# Patient Record
Sex: FEMALE | Race: WHITE | Employment: OTHER | URBAN - METROPOLITAN AREA
[De-identification: names, ages, dates, MRNs, and addresses within clinical notes are randomized per-mention and may not be internally consistent; named-entity substitution may affect disease eponyms.]

---

## 2017-04-12 ENCOUNTER — OFFICE VISIT (OUTPATIENT)
Dept: ORTHOPEDIC SURGERY | Age: 52
End: 2017-04-12

## 2017-04-12 VITALS
SYSTOLIC BLOOD PRESSURE: 130 MMHG | WEIGHT: 184 LBS | OXYGEN SATURATION: 97 % | HEART RATE: 73 BPM | TEMPERATURE: 97.7 F | RESPIRATION RATE: 18 BRPM | DIASTOLIC BLOOD PRESSURE: 80 MMHG | HEIGHT: 65 IN | BODY MASS INDEX: 30.66 KG/M2

## 2017-04-12 DIAGNOSIS — M47.27 OSTEOARTHRITIS OF SPINE WITH RADICULOPATHY, LUMBOSACRAL REGION: ICD-10-CM

## 2017-04-12 DIAGNOSIS — M62.830 MUSCLE SPASM OF BACK: ICD-10-CM

## 2017-04-12 DIAGNOSIS — M47.816 LUMBAR FACET ARTHROPATHY: ICD-10-CM

## 2017-04-12 DIAGNOSIS — M25.562 LEFT KNEE PAIN, UNSPECIFIED CHRONICITY: ICD-10-CM

## 2017-04-12 DIAGNOSIS — M54.50 PAIN OF LUMBAR SPINE: ICD-10-CM

## 2017-04-12 DIAGNOSIS — M96.1 LUMBAR POST-LAMINECTOMY SYNDROME: ICD-10-CM

## 2017-04-12 DIAGNOSIS — M53.3 PAIN OF BOTH SACROILIAC JOINTS: Primary | ICD-10-CM

## 2017-04-12 DIAGNOSIS — M53.3 SACROILIAC JOINT DYSFUNCTION OF BOTH SIDES: ICD-10-CM

## 2017-04-12 RX ORDER — CYCLOBENZAPRINE HCL 10 MG
10 TABLET ORAL
Qty: 30 TAB | Refills: 1 | Status: SHIPPED | OUTPATIENT
Start: 2017-04-12 | End: 2017-06-15 | Stop reason: SDUPTHER

## 2017-04-12 RX ORDER — MELOXICAM 15 MG/1
15 TABLET ORAL
COMMUNITY
End: 2017-09-25 | Stop reason: ALTCHOICE

## 2017-04-12 RX ORDER — TRAMADOL HYDROCHLORIDE 50 MG/1
50 TABLET ORAL
COMMUNITY
Start: 2013-11-27 | End: 2017-09-25 | Stop reason: ALTCHOICE

## 2017-04-12 RX ORDER — ONDANSETRON 8 MG/1
8 TABLET, ORALLY DISINTEGRATING ORAL
COMMUNITY
Start: 2014-05-13 | End: 2017-09-25 | Stop reason: ALTCHOICE

## 2017-04-12 RX ORDER — POLYETHYLENE GLYCOL 3350 17 G/17G
17 POWDER, FOR SOLUTION ORAL
COMMUNITY
End: 2017-09-25 | Stop reason: ALTCHOICE

## 2017-04-12 RX ORDER — ALPRAZOLAM 0.5 MG/1
TABLET ORAL
Refills: 1 | COMMUNITY
Start: 2017-04-05 | End: 2022-08-17

## 2017-04-12 RX ORDER — TAMSULOSIN HYDROCHLORIDE 0.4 MG/1
0.4 CAPSULE ORAL
COMMUNITY
Start: 2014-05-13 | End: 2017-09-25 | Stop reason: ALTCHOICE

## 2017-04-12 RX ORDER — LEVOTHYROXINE SODIUM 150 UG/1
TABLET ORAL
Refills: 4 | Status: ON HOLD | COMMUNITY
Start: 2017-02-26 | End: 2017-10-04

## 2017-04-12 RX ORDER — CYANOCOBALAMIN 1000 UG/ML
INJECTION, SOLUTION INTRAMUSCULAR; SUBCUTANEOUS
Refills: 2 | COMMUNITY
Start: 2017-03-07 | End: 2018-10-29

## 2017-04-12 RX ORDER — TOPIRAMATE 25 MG/1
75 TABLET ORAL
COMMUNITY
End: 2017-09-25 | Stop reason: ALTCHOICE

## 2017-04-12 RX ORDER — OXYCODONE AND ACETAMINOPHEN 5; 325 MG/1; MG/1
TABLET ORAL
COMMUNITY
Start: 2016-12-30 | End: 2017-09-25 | Stop reason: ALTCHOICE

## 2017-04-12 RX ORDER — FLUOXETINE HYDROCHLORIDE 40 MG/1
CAPSULE ORAL
Refills: 4 | COMMUNITY
Start: 2017-03-01 | End: 2017-09-25 | Stop reason: ALTCHOICE

## 2017-04-12 NOTE — PATIENT INSTRUCTIONS
Sacroiliac Pain: Exercises  Your Care Instructions  Here are some examples of typical rehabilitation exercises for your condition. Start each exercise slowly. Ease off the exercise if you start to have pain. Your doctor or physical therapist will tell you when you can start these exercises and which ones will work best for you. How to do the exercises  Knee-to-chest stretch    Do not do the knee-to-chest exercise if it causes or increases back or leg pain. 1. Lie on your back with your knees bent and your feet flat on the floor. You can put a small pillow under your head and neck if it is more comfortable. 2. Grasp your hands under one knee and bring the knee to your chest, keeping the other foot flat on the floor. 3. Keep your lower back pressed to the floor. Hold for at least 15 to 30 seconds. 4. Relax and lower the knee to the starting position. Repeat with the other leg. 5. Repeat 2 to 4 times with each leg. 6. To get more stretch, keep your other leg flat on the floor while pulling your knee to your chest.  Bridging    1. Lie on your back with both knees bent. Your knees should be bent about 90 degrees. 2. Tighten your belly muscles by pulling in your belly button toward your spine. Then push your feet into the floor, squeeze your buttocks, and lift your hips off the floor until your shoulders, hips, and knees are all in a straight line. 3. Hold for about 6 seconds as you continue to breathe normally, and then slowly lower your hips back down to the floor and rest for up to 10 seconds. 4. Repeat 8 to 12 times. Hip extension    1. Get down on your hands and knees on the floor. 2. Keeping your back and neck straight, lift one leg straight out behind you. When you lift your leg, keep your hips level. Don't let your back twist, and don't let your hip drop toward the floor. 3. Hold for 6 seconds. Repeat 8 to 12 times with each leg.   4. If you feel steady and strong when you do this exercise, you can make it more difficult. To do this, when you lift your leg, also lift the opposite arm straight out in front of you. For example, lift the left leg and the right arm at the same time. (This is sometimes called the \"bird dog exercise. \") Hold for 6 seconds, and repeat 8 to 12 times on each side. Clamshell    1. Lie on your side with a pillow under your head. Keep your feet and knees together and your knees bent. 2. Raise your top knee, but keep your feet together. Do not let your hips roll back. Your legs should open up like a clamshell. 3. Hold for 6 seconds. 4. Slowly lower your knee back down. Rest for 10 seconds. 5. Repeat 8 to 12 times. 6. Switch to your other side and repeat steps 1 through 5. Hamstring wall stretch    1. Lie on your back in a doorway, with one leg through the open door. 2. Slide your affected leg up the wall to straighten your knee. You should feel a gentle stretch down the back of your leg. ¨ Do not arch your back. ¨ Do not bend either knee. ¨ Keep one heel touching the floor and the other heel touching the wall. Do not point your toes. 3. Hold the stretch for at least 1 minute to begin. Then try to lengthen the time you hold the stretch to as long as 6 minutes. 4. Switch legs, and repeat steps 1 through 3.  5. Repeat 2 to 4 times. If you do not have a place to do this exercise in a doorway, there is another way to do it:  1. Lie on your back, and bend one knee. 2. Loop a towel under the ball and toes of that foot, and hold the ends of the towel in your hands. 3. Straighten your knee, and slowly pull back on the towel. You should feel a gentle stretch down the back of your leg. 4. Switch legs, and repeat steps 1 through 3.  5. Repeat 2 to 4 times. Lower abdominal strengthening    1. Lie on your back with your knees bent and your feet flat on the floor. 2. Tighten your belly muscles by pulling your belly button in toward your spine.   3. Lift one foot off the floor and bring your knee toward your chest, so that your knee is straight above your hip and your leg is bent like the letter \"L. \"  4. Lift the other knee up to the same position. 5. Lower one leg at a time to the starting position. 6. Keep alternating legs until you have lifted each leg 8 to 12 times. 7. Be sure to keep your belly muscles tight and your back still as you are moving your legs. Be sure to breathe normally. Piriformis stretch    1. Lie on your back with your legs straight. 2. Lift your affected leg, and bend your knee. With your opposite hand, reach across your body, and then gently pull your knee toward your opposite shoulder. 3. Hold the stretch for 15 to 30 seconds. 4. Switch legs and repeat steps 1 through 3.  5. Repeat 2 to 4 times. Follow-up care is a key part of your treatment and safety. Be sure to make and go to all appointments, and call your doctor if you are having problems. It's also a good idea to know your test results and keep a list of the medicines you take. Where can you learn more? Go to http://garry-neyda.info/. Enter N676 in the search box to learn more about \"Sacroiliac Pain: Exercises. \"  Current as of: May 23, 2016  Content Version: 11.2  © 9280-4473 [employer]. Care instructions adapted under license by [employer] (which disclaims liability or warranty for this information). If you have questions about a medical condition or this instruction, always ask your healthcare professional. [address] any warranty or liability for your use of this information.

## 2017-04-12 NOTE — PROGRESS NOTES
[facility]  [address] [place], [facility] [address]  Phone: [phone]  Fax: [phone]      ASSESSMENT   Cookie Quesada was seen today for back pain. Diagnoses and all orders for this visit:    Pain of both sacroiliac joints  -     SCHEDULE SURGERY    Sacroiliac joint dysfunction of both sides  -     SCHEDULE SURGERY    Left knee pain, unspecified chronicity  -     [36594] Knee 1-2V    Pain of lumbar spine  -     [56067] LS Spine 4V    Lumbar facet arthropathy (HCC)    Muscle spasm of back  -     cyclobenzaprine (FLEXERIL) 10 mg tablet; Take 1 Tab by mouth daily as needed for Muscle Spasm(s). Lumbar post-laminectomy syndrome    Osteoarthritis of spine with radiculopathy, lumbosacral region         IMPRESSION AND PLAN:  Rosa Nelson is a 47 y.o. female with history of chronic cervical and lumbar pain. She c/o progressively worsening pain in the left knee, left hip, and lower back. Pt admits to that she occasionally experience pain radiating down the legs and reports pain when rolling from side to side in bed. 1) Pt was given information on SI and lumbar arthritis exercises. 2) She was scheduled for bilateral SI injection. 3) Ms. Shanika Fried has a reminder for a \"due or due soon\" health maintenance. I have asked that she contact her primary care provider, Nino Telles MD, for follow-up on this health maintenance. 4)  reviewed. 5) Pt will follow-up in 1 month. 6) Flexeril prescription given  7) Consideration for new MRI pending response to injection  8) RFA also discussed with pt     HISTORY OF PRESENT ILLNESS:  Rosa Nelson is a 47 y.o. female with history of chronic cervical and lumbar pain. She c/o progressively worsening pain in the left knee, left hip, and lower back. Pt reports that at times she is unable to dress herself or lift legs due to the severe pain.  She admits to that she occasionally experience pain radiating down the legs, L>R. Pt also admits to pain when rolling from side to side in bed. She has not yet been followed by an orthopedist. Pt has tried bilateral L4-5 and L5-S1 facet injections with temporary relief. Pt has had 3 prior lumbar surgeries, the first surgery with Dr. Brynn Gallegos and the following 2 with Dr. Carloz Justice. Pt at this time desires to proceed with a bilateral SI injection. Pain Scale: 8/10    PCP: Samantha Curran MD       Past Medical History:   Diagnosis Date    Arthritis     Depressed 10/1/2015    Depression     Hypothyroidism     Myofascial muscle pain 10/1/2015    Narcolepsy     Psychiatric disorder     Sleep apnea     Unspecified sleep apnea     uses cpap machine        Social History     Social History    Marital status: LEGALLY      Spouse name: N/A    Number of children: N/A    Years of education: N/A     Occupational History    Not on file. Social History Main Topics    Smoking status: Current Every Day Smoker     Packs/day: 0.50    Smokeless tobacco: Never Used    Alcohol use Yes      Comment: weekends and few times per week    Drug use: No    Sexual activity: Yes     Birth control/ protection: Surgical      Comment: Hysterectomy     Other Topics Concern    Not on file     Social History Narrative       Current Outpatient Prescriptions   Medication Sig Dispense Refill    ALPRAZolam (XANAX) 0.5 mg tablet TK 1 T PO ONCE A DAY PRN FOR ANXIETY. 1    cyanocobalamin (VITAMIN B12) 1,000 mcg/mL injection INJECT 1ML SUBCUTANEOUSLY EVERY 2 WEEKS  2    FLUoxetine (PROZAC) 40 mg capsule TK ONE C PO  QAM  4    levothyroxine (SYNTHROID) 150 mcg tablet TK 1 T PO ONCE A DAY  4    cyclobenzaprine (FLEXERIL) 10 mg tablet Take 1 Tab by mouth daily as needed for Muscle Spasm(s). 30 Tab 1    liothyronine (CYTOMEL) 25 mcg tablet   3    meloxicam (MOBIC) 15 mg tablet 15 mg.      ondansetron (ZOFRAN ODT) 8 mg disintegrating tablet 8 mg.       oxyCODONE-acetaminophen (PERCOCET) 5-325 mg per tablet Take 1 Tab by Mouth Every 4 Hours As Needed for Pain. Do not exceed 4000 mg of acetaminophen per day.  polyethylene glycol (MIRALAX) 17 gram packet 17 g.  tamsulosin (FLOMAX) 0.4 mg capsule 0.4 mg.      topiramate (TOPAMAX) 25 mg tablet 75 mg.  traMADol (ULTRAM) 50 mg tablet 50 mg.      methylPREDNISolone (MEDROL, INA,) 4 mg tablet TAKE AS DIRECTED 1 Dose Pack 0    DULoxetine (CYMBALTA) 60 mg capsule Take 1 Cap by mouth two (2) times a day. 60 Cap 0    lidocaine-prilocaine (EMLA) topical cream as needed.  HYDROcodone-acetaminophen (NORCO) 5-325 mg per tablet Take 1 Tab by mouth two (2) times daily as needed for Pain. Max Daily Amount: 2 Tabs. 60 Tab 0    cream base no. 171, bulk, (COMPOUNDMAX BASE) crea 240 g by Does Not Apply route four (4) times daily. 240 g 1    levothyroxine (SYNTHROID) 175 mcg tablet Take 175 mcg by mouth Daily (before breakfast). Allergies   Allergen Reactions    Morphine Itching    Naproxen Itching         REVIEW OF SYSTEMS    Constitutional: Negative for fever, chills, or weight change. Respiratory: Negative for cough or shortness of breath. Cardiovascular: Negative for chest pain or palpitations. Gastrointestinal: Negative for acid reflux, change in bowel habits, or constipation. Genitourinary: Negative for dysuria and flank pain. Musculoskeletal: Positive for lumbar pain. Skin: Negative for rash. Neurological: Negative for headaches, dizziness, or numbness. Endo/Heme/Allergies: Negative for increased bruising. Psychiatric/Behavioral: Negative for difficulty with sleep. PHYSICAL EXAMINATION  Visit Vitals    /80    Pulse 73    Temp 97.7 °F (36.5 °C) (Oral)    Resp 18    Ht 5' 5\" (1.651 m)    Wt 184 lb (83.5 kg)    SpO2 97%    BMI 30.62 kg/m2       Constitutional: Awake, alert, and in no acute distress  Neurological: 1+ symmetrical DTRs in the upper extremities. 1+ symmetrical DTRs in the lower extremities.  Sensation to light touch is intact. Negative Susi's sign bilaterally. Skin: warm, dry, and intact. Musculoskeletal: Pain with palpation over the left SI joint. Moderate pain with extension, side to side flexion, and axial loading. Moderate tenderness to palpation over the medial aspect of the left knee. Positive SID's test bilaterally. Negative straight leg raise bilaterally. Hip Flex  Quads Hamstrings Ankle DF EHL Ankle PF   Right +4/5 +4/5 +4/5 +4/5 +4/5 +4/5   Left 4/5 4/5 4/5 +4/5 +4/5 +4/5     IMAGING:    Lumbar spine 4V x-rays from 04/12/2017 were personally reviewed with the Pt and demonstrated:  1) Dextroscoliosis . 2) Significant bony hypertrophy at L5-S1 bilaterally. 3) Anterior fusion L5-S1. 4) Mild calcification in the aorta. 5) Disc replacement at L5-S1. 6) Significant facet arthropathy at L4-5.   7) Mild degenerative discs at L1 and L3.  8) No instability. Knee x-rays from 04/12/2017 were personally reviewed with the Pt and demonstrated:  1) Normal joint space bilaterally. 2) No significant degenerative changes in the left knee. Cervical spine MRI from 06/04/2013 was personally reviewed with the Pt and demonstrated:  Results from Hospital Encounter encounter on 06/04/13   MRI CERV SPINE WO CONT    Narrative Sagittal and axial multisequence MR images of cervical spine were obtained. Normal alignment. No compression fracture or pathologic marrow signal. No  prevertebral soft tissue abnormalities. Craniocervical junction is normal.  Spinal cord shows normal signal intensity and morphology. C2-C3: No disc herniation, central or foraminal stenosis. C3-C4: No disc herniation, central or foraminal stenosis. C4-C5: Diffuse posterior disc bulge with small left paracentral disc  protrusion, slightly contacting spinal cord with no cord compression or edema. No central stenosis. No foraminal stenosis.     C5-C6: Mild posterior disc bulge with no central or foraminal stenosis. C6-C7: Mild posterior disc bulge with no central stenosis. Mild left foraminal  stenosis. Patent right foramen. C7-T1: Unremarkable.           Impression Impression: Minimal degenerative disc disease, most prominent at C4-C5 as  described.          Lumbar spine MRI from 11/07/2012 was personally reviewed with the Pt and demonstrated:  Results from [place] encounter on 11/07/12   MRI LUMB SPINE W WO CONT    Narrative Sagittal and axial multisequence MR images of lumbar spine were obtained  without and with 20 cc Magnevist gadolinium IV contrast.    No prior studies. Significant motion artifacts throughout. Disc spacer at L4-L5 and L5-S1 with  anterior anchoring screw L5-S1. There is mild anterior spondylolisthesis of L5. Posterior soft tissue artifacts in lower back. No compression fracture or  pathologic marrow signal. No abnormal enhancement. Likely small cyst in the  left kidney. Conus medullaris ends at L1 with normal morphology and signal  intensity. No abnormal enhancement. No epidural collection. L1-L2: Unremarkable. L2-L3: Minimal posterior disc bulge. No central or foraminal stenosis. Tiny  bilateral facet joint effusion. L3-L4: No disc herniation. Facet hypertrophy with mild ligamentous hypertrophy. No central stenosis. No foraminal stenosis. L4-L5: No disc material. Prominent anterior epidural fat but no central  stenosis, thecal sac compression or distortion. No suggestion of arachnoiditis. Patent neural foramina. Bony bridging along the facets, likely from previous  surgery. L5-S1: Prominent epidural fat. No disc material. No thecal sac compression. No  significant foraminal stenosis or exiting L5 nerve root compression. Small  perineuronal cysts in the sacral segment. Inflammation seen in L5-S1 facet  joints           Impression Impression: Postoperative change as above. No central stenosis or foraminal  stenosis. No disc herniation.  Facet inflammation at L5- S1.          Written by Genevieve Rodríguez, as dictated by Oc Sanchez MD.  I, Dr. Oc Sanchez confirm that all documentation is accurate.

## 2017-04-26 ENCOUNTER — HOSPITAL ENCOUNTER (OUTPATIENT)
Age: 52
Setting detail: OUTPATIENT SURGERY
Discharge: HOME | End: 2017-04-26
Attending: PHYSICAL MEDICINE & REHABILITATION | Admitting: PHYSICAL MEDICINE & REHABILITATION
Payer: MEDICARE

## 2017-04-26 ENCOUNTER — APPOINTMENT (OUTPATIENT)
Dept: GENERAL RADIOLOGY | Age: 52
End: 2017-04-26
Attending: PHYSICAL MEDICINE & REHABILITATION
Payer: MEDICARE

## 2017-04-26 VITALS
OXYGEN SATURATION: 96 % | TEMPERATURE: 98.1 F | RESPIRATION RATE: 16 BRPM | SYSTOLIC BLOOD PRESSURE: 98 MMHG | HEIGHT: 65 IN | WEIGHT: 184 LBS | DIASTOLIC BLOOD PRESSURE: 80 MMHG | BODY MASS INDEX: 30.66 KG/M2 | HEART RATE: 96 BPM

## 2017-04-26 PROCEDURE — 74011250636 HC RX REV CODE- 250/636

## 2017-04-26 PROCEDURE — 74011250637 HC RX REV CODE- 250/637: Performed by: PHYSICAL MEDICINE & REHABILITATION

## 2017-04-26 PROCEDURE — 76010000009 HC PAIN MGT 0 TO 30 MIN PROC: Performed by: PHYSICAL MEDICINE & REHABILITATION

## 2017-04-26 PROCEDURE — 74011636320 HC RX REV CODE- 636/320

## 2017-04-26 PROCEDURE — 74011000250 HC RX REV CODE- 250

## 2017-04-26 RX ORDER — DEXAMETHASONE SODIUM PHOSPHATE 100 MG/10ML
INJECTION INTRAMUSCULAR; INTRAVENOUS AS NEEDED
Status: DISCONTINUED | OUTPATIENT
Start: 2017-04-26 | End: 2017-04-26 | Stop reason: HOSPADM

## 2017-04-26 RX ORDER — LIDOCAINE HYDROCHLORIDE 10 MG/ML
INJECTION, SOLUTION EPIDURAL; INFILTRATION; INTRACAUDAL; PERINEURAL AS NEEDED
Status: DISCONTINUED | OUTPATIENT
Start: 2017-04-26 | End: 2017-04-26 | Stop reason: HOSPADM

## 2017-04-26 RX ORDER — DIAZEPAM 5 MG/1
5-20 TABLET ORAL ONCE
Status: COMPLETED | OUTPATIENT
Start: 2017-04-26 | End: 2017-04-26

## 2017-04-26 RX ORDER — SODIUM CHLORIDE 0.9 % (FLUSH) 0.9 %
5-10 SYRINGE (ML) INJECTION AS NEEDED
Status: DISCONTINUED | OUTPATIENT
Start: 2017-04-26 | End: 2017-04-26 | Stop reason: HOSPADM

## 2017-04-26 RX ADMIN — DIAZEPAM 10 MG: 5 TABLET ORAL at 13:48

## 2017-04-26 NOTE — PROCEDURES
Bi Procedure Note    Patient Name: Shaniqua Richards    Date of Procedure: April 26, 2017    Preoperative Diagnosis: Sacroiliac Joint Pain and Dysfunction    Post Operative Diagnosis: same    Procedure: SI Joint Injection bilateral      Consent: Informed consent was obtained prior to the procedure. The patient was given the opportunity to ask questions regarding the procedure and its associated risks. In addition to the potential risks associated with the procedure itself, the patient was informed both verbally and in writing of potential side effects of the use of glucocorticoids. The patient appeared to comprehend the informed consent and desired to have the procedure performed. Procedure: The patient was placed in the prone position on the flouroscopy table and the back was prepped and draped in the usual sterile manner. A #22 gauge spinal needle was then advanced to lie within the SI joint after local Lidocaine 1% injection. The procedure was repeated for the contralateral SI joint. A total of 30 mg of preservative free dexamethasone  and 5 ml of Lidocaine was introduced into SI joints. The injection area was cleaned and bandaids applied. No excessive bleeding was noted. Patient dressed and was discharged to home with instructions. Discussion:  (x) The patient tolerated the procedure well.     ( )       Corrie Shepherd MD  April 26, 2017

## 2017-04-26 NOTE — H&P
Date of Surgery Update:  Mari Burgos was seen and examined. History and physical has been reviewed. The patient has been examined. There have been no significant clinical changes since the completion of the last office visit.       Signed By: Néstor Lynn MD     April 26, 2017 1:17 PM

## 2017-04-26 NOTE — DISCHARGE INSTRUCTIONS
[facility]   (ANDRES)  Dr. Dorothy Hamman, Dr. Bonnie Fortune, Dr. Nydia Lott not drive a car, operate heavy machinery or dangerous equipment for 24 hours. * Activity as tolerated; rest for the remainder of the day. * Resume pre-block medications including those for your family doctor. * Do not drink alcoholic beverages for 24 hours. Alcohol and the medications you have received may interact and cause an adverse reaction. * You may feel better this evening and worse tomorrow, as the numbing medications wears off and the steroid has yet to begin to work. After 48 hrs the steroid should begin to release bringing you relief. * You may shower this evening and remove any bandages. * Avoid hot tubs and heating pads for 24 hours. You may use cold packs on the procedure site as tolerated for the first 24 hours. * If a headache develops, drink plenty of fluids and rest.  Take over the counter medications for headache if needed. If the headache continues longer than 24 hours, call MD at the [address] [place] [address]. [phone]    * Continue taking pain medications as needed. * You may resume your regular diet if tolerated. Otherwise, start with sips of water and advance slowly. * If Diabetic: check your blood sugar three times a day for the next 3 days. If your sugar is greater than 300 call your family doctor. If your sugar is greater than 400, have someone transport you to the nearest Emergency Room. * If you experience any of the following problems, Please Call the [address] [place] [address] at [phone].         * Shortness of Breath    * Fever of 101 or higher    * Nausea / Vomiting    * Severe Headache    * Weakness or numbness in arms or legs that is not      resolving    * Prolonged increase in pain greater than 4 days      DISCHARGE SUMMARY from Nurse      PATIENT INSTRUCTIONS:    After oral sedation, for 24 hours or while taking prescription Narcotics:  · Limit your activities  · Do not drive and operate hazardous machinery  · Do not make important personal or business decisions  · Do  not drink alcoholic beverages  · If you have not urinated within 8 hours after discharge, please contact your surgeon on call. Report the following to your surgeon:  · Excessive pain, swelling, redness or odor of or around the surgical area  · Temperature over 101  · Nausea and vomiting lasting longer than 4 hours or if unable to take medications  · Any signs of decreased circulation or nerve impairment to extremity: change in color, persistent  numbness, tingling, coldness or increase pain  · Any questions            What to do at Home:  Recommended activity: Activity as tolerated, NO DRIVING FOR 12 Hours post injection          *  Please give a list of your current medications to your Primary Care Provider. *  Please update this list whenever your medications are discontinued, doses are      changed, or new medications (including over-the-counter products) are added. *  Please carry medication information at all times in case of emergency situations. These are general instructions for a healthy lifestyle:    No smoking/ No tobacco products/ Avoid exposure to second hand smoke    Surgeon General's Warning:  Quitting smoking now greatly reduces serious risk to your health. Obesity, smoking, and sedentary lifestyle greatly increases your risk for illness    A healthy diet, regular physical exercise & weight monitoring are important for maintaining a healthy lifestyle    You may be retaining fluid if you have a history of heart failure or if you experience any of the following symptoms:  Weight gain of 3 pounds or more overnight or 5 pounds in a week, increased swelling in our hands or feet or shortness of breath while lying flat in bed.   Please call your doctor as soon as you notice any of these symptoms; do not wait until your next office visit. Recognize signs and symptoms of STROKE:    F-face looks uneven    A-arms unable to move or move unevenly    S-speech slurred or non-existent    T-time-call 911 as soon as signs and symptoms begin-DO NOT go       Back to bed or wait to see if you get better-TIME IS BRAIN. MyChart Activation    Thank you for requesting access to [employer]. Please follow the instructions below to securely access and download your online medical record. [employer] allows you to send messages to your doctor, view your test results, renew your prescriptions, schedule appointments, and more. How Do I Sign Up? 1. In your internet browser, go to [email or web address]  2. Click on the First Time User? Click Here link in the Sign In box. You will be redirect to the New Member Sign Up page. 3. Enter your [employer] Access Code exactly as it appears below. You will not need to use this code after youve completed the sign-up process. If you do not sign up before the expiration date, you must request a new code. [employer] Access Code: [email or web address]  Expires: 2017  2:39 PM (This is the date your [employer] access code will )    4. Enter the last four digits of your Social Security Number (xxxx) and Date of Birth (mm/dd/yyyy) as indicated and click Submit. You will be taken to the next sign-up page. 5. Create a [employer] ID. This will be your [employer] login ID and cannot be changed, so think of one that is secure and easy to remember. 6. Create a [employer] password. You can change your password at any time. 7. Enter your Password Reset Question and Answer. This can be used at a later time if you forget your password. 8. Enter your e-mail address. You will receive e-mail notification when new information is available in [address] 9. Click Sign Up. You can now view and download portions of your medical record.   10. Click the Download Summary menu link to download a portable copy of your medical information. Additional Information    If you have questions, please visit the Frequently Asked Questions section of the [employer] website at [email or web address] Remember, [employer] is NOT to be used for urgent needs. For medical emergencies, dial 911.

## 2017-06-15 DIAGNOSIS — M62.830 MUSCLE SPASM OF BACK: ICD-10-CM

## 2017-06-15 RX ORDER — CYCLOBENZAPRINE HCL 10 MG
TABLET ORAL
Qty: 30 TAB | Refills: 0 | Status: SHIPPED | OUTPATIENT
Start: 2017-06-15 | End: 2017-09-25 | Stop reason: ALTCHOICE

## 2017-09-25 ENCOUNTER — OFFICE VISIT (OUTPATIENT)
Dept: ORTHOPEDIC SURGERY | Age: 52
End: 2017-09-25

## 2017-09-25 VITALS
HEIGHT: 65 IN | WEIGHT: 188 LBS | SYSTOLIC BLOOD PRESSURE: 133 MMHG | DIASTOLIC BLOOD PRESSURE: 68 MMHG | RESPIRATION RATE: 18 BRPM | TEMPERATURE: 98 F | BODY MASS INDEX: 31.32 KG/M2 | HEART RATE: 82 BPM | OXYGEN SATURATION: 92 %

## 2017-09-25 DIAGNOSIS — M96.1 LUMBAR POST-LAMINECTOMY SYNDROME: Primary | ICD-10-CM

## 2017-09-25 DIAGNOSIS — M47.816 SPONDYLOSIS OF LUMBAR REGION WITHOUT MYELOPATHY OR RADICULOPATHY: ICD-10-CM

## 2017-09-25 PROBLEM — M54.50 CHRONIC BILATERAL LOW BACK PAIN WITHOUT SCIATICA: Status: ACTIVE | Noted: 2017-09-25

## 2017-09-25 PROBLEM — G89.29 CHRONIC BILATERAL LOW BACK PAIN WITHOUT SCIATICA: Status: ACTIVE | Noted: 2017-09-25

## 2017-09-25 RX ORDER — METHYLPREDNISOLONE 4 MG/1
TABLET ORAL
Qty: 1 DOSE PACK | Refills: 0 | Status: ON HOLD | OUTPATIENT
Start: 2017-09-25 | End: 2017-11-29

## 2017-09-25 NOTE — PROGRESS NOTES
Chief complaint/History of Present Illness:  Chief Complaint   Patient presents with    Back Pain     follow up, lower back pain, sometimes burning pain    Hip Pain     HPI  Loy Gardiner is a  47 y.o.  female      HISTORY OF PRESENT ILLNESS:  The patient comes in today after having last been seen on October 12, 2016, by Dr. Avani Castro. This is a patient who has had three prior back surgeries, an ALIF at L5-S1, PLIF at L4-L5. So, she is fused from L4 to S1. Apparently, he had a surgery by Dr. Stevan Clements, a surgery by Dr. Guevara Perez, and removal of hardware by Dr. Alfonso Abebe in December 2009 but continues to suffer from low back pain. She is not having any radiating leg pain. She states her pain is in the low back and then increases up into her spine. She feels like sometimes when her shirt hits her skin, that causes pain. She feels like she has some swelling. About 7-10 days ago, when she was at [facility], she had a flare of pain without injury or excessive activity. She tried using heat, ice, Tylenol, and Bio Freeze without much help. She states Ibuprofen does not really help, but she is able to take it. She is allergic to Naproxen. She states it gives her shortness of breath and hives. She denies fever. She does have urinary incontinence, it sounds like, from time to time and does have a urology appointment coming up. She has had a bladder tack in the past.  She states Flexeril really does not help all that much. She smokes one-fourth pack of cigarettes per day. She is on Social Security disability. PHYSICAL EXAM:  Ms. Luba Faith is a 41-year-old female. She is alert and oriented. She has a full weight bearing, slightly antalgic gait and 4/5 strength of the bilateral lower extremities and negative straight leg raise. She has a little bit of pain, very mild pain, with internal and external rotation of that left hip.        ASSESSENT/PLAN:  This is a patient with post lumbar laminectomy syndrome, lumbar spondylosis. She has had facet blocks in the past that seemed to help. We will go ahead and reschedule her for bilateral L4-L5 and L5-S1 facet blocks. I am also calling her in a Medrol Dosepak to start. She takes Cymbalta 60 mg through Dr. Humberto Christina for bipolar disease. We will see her back after the block. Review of systems:    Past Medical History:   Diagnosis Date    Arthritis     Depressed 10/1/2015    Depression     Hypothyroidism     Myofascial muscle pain 10/1/2015    Narcolepsy     Psychiatric disorder     Sleep apnea     Unspecified sleep apnea     uses cpap machine     Past Surgical History:   Procedure Laterality Date    HX BLADDER SUSPENSION      HX CHOLECYSTECTOMY      HX HYSTERECTOMY      HX LUMBAR FUSION  2000    L4-L5-S1 decompression and fusion, Dr. Kd Escobar  2010    removal of segmental instrumentation, Dr. Alma Grove  2014    and removed bursitis from right shoulder    HX ORTHOPAEDIC      Right Foot surgery    HX SHOULDER ARTHROSCOPY Right      Social History     Social History    Marital status: LEGALLY      Spouse name: N/A    Number of children: N/A    Years of education: N/A     Occupational History    Not on file.      Social History Main Topics    Smoking status: Current Every Day Smoker     Packs/day: 0.50    Smokeless tobacco: Never Used    Alcohol use Yes      Comment: weekends and few times per week    Drug use: No    Sexual activity: Yes     Birth control/ protection: Surgical      Comment: Hysterectomy     Other Topics Concern    Not on file     Social History Narrative     Family History   Problem Relation Age of Onset    Cancer Mother     Heart Disease Father     Kidney Disease Father     Diabetes Father     No Known Problems Brother        Physical Exam:  Visit Vitals    /68    Pulse 82    Temp 98 °F (36.7 °C) (Oral)    Resp 18    Ht 5' 5\" (1.651 m)    Wt 188 lb (85.3 kg)    SpO2 92%    BMI 31.28 kg/m2     Pain Scale: 5/10     has been . reviewed and is appropriate          Diagnoses and all orders for this visit:    1. Lumbar post-laminectomy syndrome    2. Spondylosis of lumbar region without myelopathy or radiculopathy  -     SCHEDULE SURGERY    Other orders  -     methylPREDNISolone (MEDROL DOSEPACK) 4 mg tablet; Per dose pack instructions            Follow-up Disposition:  Return for facet block.         We have informed Loy Gardiner to notify us for immediate appointment if she has any worsening neurogical symptoms or if an emergency situation presents, then call [phone]

## 2017-09-25 NOTE — PATIENT INSTRUCTIONS
WhoSay Activation    Thank you for requesting access to WhoSay. Please follow the instructions below to securely access and download your online medical record. WhoSay allows you to send messages to your doctor, view your test results, renew your prescriptions, schedule appointments, and more. How Do I Sign Up? 1. In your internet browser, go to [email or web address]  2. Click on the First Time User? Click Here link in the Sign In box. You will be redirect to the New Member Sign Up page. 3. Enter your WhoSay Access Code exactly as it appears below. You will not need to use this code after youve completed the sign-up process. If you do not sign up before the expiration date, you must request a new code. WhoSay Access Code: Jeana Lopez  Expires: 2017  8:54 AM (This is the date your WhoSay access code will )    4. Enter the last four digits of your Social Security Number (xxxx) and Date of Birth (mm/dd/yyyy) as indicated and click Submit. You will be taken to the next sign-up page. 5. Create a WhoSay ID. This will be your WhoSay login ID and cannot be changed, so think of one that is secure and easy to remember. 6. Create a WhoSay password. You can change your password at any time. 7. Enter your Password Reset Question and Answer. This can be used at a later time if you forget your password. 8. Enter your e-mail address. You will receive e-mail notification when new information is available in [address] 9. Click Sign Up. You can now view and download portions of your medical record. 10. Click the Download Summary menu link to download a portable copy of your medical information. Additional Information    If you have questions, please visit the Frequently Asked Questions section of the WhoSay website at [email or web address] Remember, WhoSay is NOT to be used for urgent needs. For medical emergencies, dial 911.          Back Pain: Care Instructions  Your Care Instructions    Back pain has many possible causes. It is often related to problems with muscles and ligaments of the back. It may also be related to problems with the nerves, discs, or bones of the back. Moving, lifting, standing, sitting, or sleeping in an awkward way can strain the back. Sometimes you don't notice the injury until later. Arthritis is another common cause of back pain. Although it may hurt a lot, back pain usually improves on its own within several weeks. Most people recover in 12 weeks or less. Using good home treatment and being careful not to stress your back can help you feel better sooner. Follow-up care is a key part of your treatment and safety. Be sure to make and go to all appointments, and call your doctor if you are having problems. Its also a good idea to know your test results and keep a list of the medicines you take. How can you care for yourself at home? · Sit or lie in positions that are most comfortable and reduce your pain. Try one of these positions when you lie down:  ¨ Lie on your back with your knees bent and supported by large pillows. ¨ Lie on the floor with your legs on the seat of a sofa or chair. Kay Sermons on your side with your knees and hips bent and a pillow between your legs. ¨ Lie on your stomach if it does not make pain worse. · Do not sit up in bed, and avoid soft couches and twisted positions. Bed rest can help relieve pain at first, but it delays healing. Avoid bed rest after the first day of back pain. · Change positions every 30 minutes. If you must sit for long periods of time, take breaks from sitting. Get up and walk around, or lie in a comfortable position. · Try using a heating pad on a low or medium setting for 15 to 20 minutes every 2 or 3 hours. Try a warm shower in place of one session with the heating pad. · You can also try an ice pack for 10 to 15 minutes every 2 to 3 hours.  Put a thin cloth between the ice pack and your skin. · Take pain medicines exactly as directed. ¨ If the doctor gave you a prescription medicine for pain, take it as prescribed. ¨ If you are not taking a prescription pain medicine, ask your doctor if you can take an over-the-counter medicine. · Take short walks several times a day. You can start with 5 to 10 minutes, 3 or 4 times a day, and work up to longer walks. Walk on level surfaces and avoid hills and stairs until your back is better. · Return to work and other activities as soon as you can. Continued rest without activity is usually not good for your back. · To prevent future back pain, do exercises to stretch and strengthen your back and stomach. Learn how to use good posture, safe lifting techniques, and proper body mechanics. When should you call for help? Call your doctor now or seek immediate medical care if:  · You have new or worsening numbness in your legs. · You have new or worsening weakness in your legs. (This could make it hard to stand up.)  · You lose control of your bladder or bowels. Watch closely for changes in your health, and be sure to contact your doctor if:  · Your pain gets worse. · You are not getting better after 2 weeks. Where can you learn more? Go to http://garry-neyda.info/. Enter V516 in the search box to learn more about \"Back Pain: Care Instructions. \"  Current as of: March 21, 2017  Content Version: 11.3  © 7539-5320 [employer]ov[employer]. Care instructions adapted under license by Flexenclosure (which disclaims liability or warranty for this information). If you have questions about a medical condition or this instruction, always ask your healthcare professional. [address] any warranty or liability for your use of this information.

## 2017-09-25 NOTE — MR AVS SNAPSHOT
Visit Information Date & Time Provider Department Dept. Phone Encounter #  
 9/25/2017  9:10 AM Jeovany Baca NP [facility] [phone] [id number] Follow-up Instructions Return for facet block. Follow-up and Disposition History 11/3/2017  3:00 PM  
Any with Elli De Leon MD  
[facility] [address] ([address]) Appt Note: NP  
 [address] [facility] [address] Upcoming Health Maintenance Date Due Hepatitis C Screening 1965 Pneumococcal 19-64 Medium Risk (1 of 1 - PPSV23) 4/4/1983 DTaP/Tdap/Td series (1 - Tdap) 4/4/1985 PAP AKA CERVICAL CYTOLOGY 4/4/1985 BREAST CANCER SCRN MAMMOGRAM 4/4/2014 FOBT Q 1 YEAR AGE 50-75 4/4/2014 INFLUENZA AGE 9 TO ADULT 8/1/2017 Allergies as of 9/25/2017  Review Complete On: 9/25/2017 By: Jeovany Baca NP Severity Noted Reaction Type Reactions Morphine  01/29/2013    Itching Naproxen  01/29/2013    Itching Swelling, hives, difficulty breathing Current Immunizations  Never Reviewed No immunizations on file. Not reviewed this visit You Were Diagnosed With   
  
 Codes Comments Lumbar post-laminectomy syndrome    -  Primary ICD-10-CM: M96.1 ICD-9-CM: 722.83 Spondylosis of lumbar region without myelopathy or radiculopathy     ICD-10-CM: M47.816 ICD-9-CM: 721.3 Vitals BP Pulse Temp Resp Height(growth percentile) Weight(growth percentile) 133/68 82 98 °F (36.7 °C) (Oral) 18 5' 5\" (1.651 m) 188 lb (85.3 kg) SpO2 BMI OB Status Smoking Status 92% 31.28 kg/m2 Hysterectomy Current Every Day Smoker BMI and BSA Data Body Mass Index Body Surface Area  
 31.28 kg/m 2 1.98 m 2 Preferred Pharmacy Pharmacy Name Phone  [phone] [place] [facility] [address] [place] [facility] [phone] Your Updated Medication List  
  
   
This list is accurate as of: 9/25/17 10:00 AM.  Always use your most recent med list.  
  
  
  
  
 ALPRAZolam 0.5 mg tablet Commonly known as:  Zachariah Flack TK 1 T PO ONCE A DAY PRN FOR ANXIETY. cyanocobalamin 1,000 mcg/mL injection Commonly known as:  VITAMIN B12 INJECT 1ML SUBCUTANEOUSLY EVERY 2 WEEKS DULoxetine 60 mg capsule Commonly known as:  CYMBALTA Take 1 Cap by mouth two (2) times a day. * levothyroxine 175 mcg tablet Commonly known as:  SYNTHROID Take 175 mcg by mouth Daily (before breakfast). * levothyroxine 150 mcg tablet Commonly known as:  SYNTHROID TK 1 T PO ONCE A DAY  
  
 methylPREDNISolone 4 mg tablet Commonly known as:  Daya Sample Per dose pack instructions * Notice: This list has 2 medication(s) that are the same as other medications prescribed for you. Read the directions carefully, and ask your doctor or other care provider to review them with you. Prescriptions Sent to Pharmacy Refills  
 methylPREDNISolone (MEDROL DOSEPACK) 4 mg tablet 0 Sig: Per dose pack instructions Class: Normal  
 Pharmacy: [address] [place] [address], [address] [place] [address] #: [phone] We Performed the Following SCHEDULE SURGERY [[id number] Custom] Follow-up Instructions Return for facet block. Patient Instructions SaaSAssurance Activation Thank you for requesting access to SaaSAssurance. Please follow the instructions below to securely access and download your online medical record. SaaSAssurance allows you to send messages to your doctor, view your test results, renew your prescriptions, schedule appointments, and more. How Do I Sign Up? 1. In your internet browser, go to [email or web address] 
2. Click on the First Time User? Click Here link in the Sign In box.  You will be redirect to the New Member Sign Up page. 3. Enter your [facility] Access Code exactly as it appears below. You will not need to use this code after youve completed the sign-up process. If you do not sign up before the expiration date, you must request a new code. [facility] Access Code: Rosario Ramon Expires: 2017  8:54 AM (This is the date your [facility] access code will ) 4. Enter the last four digits of your Social Security Number (xxxx) and Date of Birth (mm/dd/yyyy) as indicated and click Submit. You will be taken to the next sign-up page. 5. Create a [facility] ID. This will be your [facility] login ID and cannot be changed, so think of one that is secure and easy to remember. 6. Create a [facility] password. You can change your password at any time. 7. Enter your Password Reset Question and Answer. This can be used at a later time if you forget your password. 8. Enter your e-mail address. You will receive e-mail notification when new information is available in [address] 9. Click Sign Up. You can now view and download portions of your medical record. 10. Click the Download Summary menu link to download a portable copy of your medical information. Additional Information If you have questions, please visit the Frequently Asked Questions section of the [facility] website at [email or web address] Remember, [facility] is NOT to be used for urgent needs. For medical emergencies, dial 911. Back Pain: Care Instructions Your Care Instructions Back pain has many possible causes. It is often related to problems with muscles and ligaments of the back. It may also be related to problems with the nerves, discs, or bones of the back. Moving, lifting, standing, sitting, or sleeping in an awkward way can strain the back. Sometimes you don't notice the injury until later. Arthritis is another common cause of back pain. Although it may hurt a lot, back pain usually improves on its own within several weeks. Most people recover in 12 weeks or less. Using good home treatment and being careful not to stress your back can help you feel better sooner. Follow-up care is a key part of your treatment and safety. Be sure to make and go to all appointments, and call your doctor if you are having problems. Its also a good idea to know your test results and keep a list of the medicines you take. How can you care for yourself at home? · Sit or lie in positions that are most comfortable and reduce your pain. Try one of these positions when you lie down: ¨ Lie on your back with your knees bent and supported by large pillows. ¨ Lie on the floor with your legs on the seat of a sofa or chair. Radha Mould on your side with your knees and hips bent and a pillow between your legs. ¨ Lie on your stomach if it does not make pain worse. · Do not sit up in bed, and avoid soft couches and twisted positions. Bed rest can help relieve pain at first, but it delays healing. Avoid bed rest after the first day of back pain. · Change positions every 30 minutes. If you must sit for long periods of time, take breaks from sitting. Get up and walk around, or lie in a comfortable position. · Try using a heating pad on a low or medium setting for 15 to 20 minutes every 2 or 3 hours. Try a warm shower in place of one session with the heating pad. · You can also try an ice pack for 10 to 15 minutes every 2 to 3 hours. Put a thin cloth between the ice pack and your skin. · Take pain medicines exactly as directed. ¨ If the doctor gave you a prescription medicine for pain, take it as prescribed. ¨ If you are not taking a prescription pain medicine, ask your doctor if you can take an over-the-counter medicine. · Take short walks several times a day. You can start with 5 to 10 minutes, 3 or 4 times a day, and work up to longer walks.  Walk on level surfaces and avoid hills and stairs until your back is better. · Return to work and other activities as soon as you can. Continued rest without activity is usually not good for your back. · To prevent future back pain, do exercises to stretch and strengthen your back and stomach. Learn how to use good posture, safe lifting techniques, and proper body mechanics. When should you call for help? Call your doctor now or seek immediate medical care if: 
· You have new or worsening numbness in your legs. · You have new or worsening weakness in your legs. (This could make it hard to stand up.) · You lose control of your bladder or bowels. Watch closely for changes in your health, and be sure to contact your doctor if: 
· Your pain gets worse. · You are not getting better after 2 weeks. Where can you learn more? Go to http://garry-neyda.info/. Enter R732 in the search box to learn more about \"Back Pain: Care Instructions. \" Current as of: March 21, 2017 Content Version: 11.3 © 7765-4809 Broadcast Pix. Care instructions adapted under license by MBW Enterprise (which disclaims liability or warranty for this information). If you have questions about a medical condition or this instruction, always ask your healthcare professional. [place] [address] any warranty or liability for your use of this information. Patient Instructions History Introducing [facility] HE[facility]! [facility] introduces [facility]Edu patient portal. Now you can access parts of your medical record, email your doctor's office, and request medication refills online. 1. In your internet browser, go to [email or web address] 2. Click on the First Time User? Click Here link in the Sign In box. You will see the New Member Sign Up page. 3. Enter your [facility]Edu Access Code exactly as it appears below.  You will not need to use this code after youve completed the sign-up process. If you do not sign up before the expiration date, you must request a new code. · PurThread Technologies Access Code: Fab Terrazas Expires: 12/24/2017  8:54 AM 
 
4. Enter the last four digits of your Social Security Number (xxxx) and Date of Birth (mm/dd/yyyy) as indicated and click Submit. You will be taken to the next sign-up page. 5. Create a Pur[employer] Technologies ID. This will be your Pur[employer] login ID and cannot be changed, so think of one that is secure and easy to remember. 6. Create a Pur[employer] Technologies password. You can change your password at any time. 7. Enter your Password Reset Question and Answer. This can be used at a later time if you forget your password. 8. Enter your e-mail address. You will receive e-mail notification when new information is available in [address] 9. Click Sign Up. You can now view and download portions of your medical record. 10. Click the Download Summary menu link to download a portable copy of your medical information. If you have questions, please visit the Frequently Asked Questions section of the [employer] website. Remember, [employer] is NOT to be used for urgent needs. For medical emergencies, dial 911. Now available from your iPhone and Android! Please provide this summary of care documentation to your next provider. Your primary care clinician is listed as Alyssa Rodriguez. If you have any questions after today's visit, please call [phone].

## 2017-10-04 ENCOUNTER — HOSPITAL ENCOUNTER (OUTPATIENT)
Age: 52
Setting detail: OUTPATIENT SURGERY
Discharge: HOME | End: 2017-10-04
Attending: PHYSICAL MEDICINE & REHABILITATION | Admitting: PHYSICAL MEDICINE & REHABILITATION
Payer: MEDICARE

## 2017-10-04 ENCOUNTER — APPOINTMENT (OUTPATIENT)
Dept: GENERAL RADIOLOGY | Age: 52
End: 2017-10-04
Attending: PHYSICAL MEDICINE & REHABILITATION
Payer: MEDICARE

## 2017-10-04 VITALS
OXYGEN SATURATION: 96 % | BODY MASS INDEX: 31.32 KG/M2 | WEIGHT: 188 LBS | RESPIRATION RATE: 18 BRPM | TEMPERATURE: 98 F | HEIGHT: 65 IN | HEART RATE: 86 BPM | SYSTOLIC BLOOD PRESSURE: 110 MMHG | DIASTOLIC BLOOD PRESSURE: 89 MMHG

## 2017-10-04 PROCEDURE — 74011000250 HC RX REV CODE- 250

## 2017-10-04 PROCEDURE — 74011250637 HC RX REV CODE- 250/637: Performed by: PHYSICAL MEDICINE & REHABILITATION

## 2017-10-04 PROCEDURE — 74011250636 HC RX REV CODE- 250/636

## 2017-10-04 PROCEDURE — 76010000009 HC PAIN MGT 0 TO 30 MIN PROC: Performed by: PHYSICAL MEDICINE & REHABILITATION

## 2017-10-04 RX ORDER — DEXAMETHASONE SODIUM PHOSPHATE 100 MG/10ML
INJECTION INTRAMUSCULAR; INTRAVENOUS AS NEEDED
Status: DISCONTINUED | OUTPATIENT
Start: 2017-10-04 | End: 2017-10-04 | Stop reason: HOSPADM

## 2017-10-04 RX ORDER — LIDOCAINE HYDROCHLORIDE 10 MG/ML
INJECTION, SOLUTION EPIDURAL; INFILTRATION; INTRACAUDAL; PERINEURAL AS NEEDED
Status: DISCONTINUED | OUTPATIENT
Start: 2017-10-04 | End: 2017-10-04 | Stop reason: HOSPADM

## 2017-10-04 RX ORDER — DIAZEPAM 5 MG/1
5-20 TABLET ORAL ONCE
Status: COMPLETED | OUTPATIENT
Start: 2017-10-04 | End: 2017-10-04

## 2017-10-04 RX ADMIN — DIAZEPAM 10 MG: 5 TABLET ORAL at 12:46

## 2017-10-04 NOTE — PROCEDURES
Facet Joint Block Procedure Note    Patient Name: Romelia Bolden    Date of Procedure: October 4, 2017    Preoperative Diagnosis: Osteoarthritis of lumbar spine, unspecified spinal osteoarthritis complication status [B78.709]    Post Operative Diagnosis:Osteoarthritis of lumbar spine, unspecified spinal osteoarthritis complication status [R32.014]     Procedure:  bilateral  L4-L5 Facet Joint Block  bilateral  L5-S1 Facet Joint Block    Consent: Informed consent was obtained prior to the procedure. The patient was given the opportunity to ask questions regarding the procedure and its associated risks. In addition to the potential risks associated with the procedure itself, the patient was informed both verbally and in writing of potential side effects of the use of glucocorticoids. The patient appeared to comprehend the informed consent and desired to have the procedure perfored. Procedure: The patient was placed in the prone position on the flouroscopy table and the back was prepped and draped in the usual sterile manner. At each blocked level, the exact location of the facet joint was identified with flouroscopy, and after local Lidocaine 1% injection, a #22 gauge spinal needle was then advanced toward the joint. A total of 30 mg of preservative free Dexamethasone and 5 cc of Lidocaine was injected around and equally divided among all of the sites. The patient tolerated the procedure well. The injection area was cleaned and bandaids applied. No excessive bleeding was noted. Patient dressed and was discharged to home with instructions.        José Miguel Villalta MD  October 4, 2017

## 2017-10-04 NOTE — H&P
Date of Surgery Update:  Andrews Burrell was seen and examined. History and physical has been reviewed. The patient has been examined. There have been no significant clinical changes since the completion of the last office visit.       Signed By: Leonarda Arreola MD     October 4, 2017 12:57 PM

## 2017-10-04 NOTE — DISCHARGE INSTRUCTIONS
[facility]   (ANDRES)  Dr. Matti Petersen, Dr. Zechariah Yee, Dr. Matt Jeter not drive a car, operate heavy machinery or dangerous equipment for 24 hours. * Activity as tolerated; rest for the remainder of the day. * Resume pre-block medications including those for your family doctor. * Do not drink alcoholic beverages for 24 hours. Alcohol and the medications you have received may interact and cause an adverse reaction. * You may feel better this evening and worse tomorrow, as the numbing medications wears off and the steroid has yet to begin to work. After 48 hrs the steroid should begin to release bringing you relief. * You may shower this evening and remove any bandages. * Avoid hot tubs and heating pads for 24 hours. You may use cold packs on the procedure site as tolerated for the first 24 hours. * If a headache develops, drink plenty of fluids and rest.  Take over the counter medications for headache if needed. If the headache continues longer than 24 hours, call MD at the [address] [place] [address]. [phone]    * Continue taking pain medications as needed. * You may resume your regular diet if tolerated. Otherwise, start with sips of water and advance slowly. * If Diabetic: check your blood sugar three times a day for the next 3 days. If your sugar is greater than 300 call your family doctor. If your sugar is greater than 400, have someone transport you to the nearest Emergency Room. * If you experience any of the following problems, Please Call the [address] [place] [address] at [phone].         * Shortness of Breath    * Fever of 101 or higher    * Nausea / Vomiting    * Severe Headache    * Weakness or numbness in arms or legs that is not      resolving    * Prolonged increase in pain greater than 4 days      DISCHARGE SUMMARY from Nurse      PATIENT INSTRUCTIONS:    After oral sedation, for 24 hours or while taking prescription Narcotics:  · Limit your activities  · Do not drive and operate hazardous machinery  · Do not make important personal or business decisions  · Do  not drink alcoholic beverages  · If you have not urinated within 8 hours after discharge, please contact your surgeon on call. Report the following to your surgeon:  · Excessive pain, swelling, redness or odor of or around the surgical area  · Temperature over 101  · Nausea and vomiting lasting longer than 4 hours or if unable to take medications  · Any signs of decreased circulation or nerve impairment to extremity: change in color, persistent  numbness, tingling, coldness or increase pain  · Any questions            What to do at Home:  Recommended activity: Activity as tolerated, NO DRIVING FOR 12 Hours post injection          *  Please give a list of your current medications to your Primary Care Provider. *  Please update this list whenever your medications are discontinued, doses are      changed, or new medications (including over-the-counter products) are added. *  Please carry medication information at all times in case of emergency situations. These are general instructions for a healthy lifestyle:    No smoking/ No tobacco products/ Avoid exposure to second hand smoke    Surgeon General's Warning:  Quitting smoking now greatly reduces serious risk to your health. Obesity, smoking, and sedentary lifestyle greatly increases your risk for illness    A healthy diet, regular physical exercise & weight monitoring are important for maintaining a healthy lifestyle    You may be retaining fluid if you have a history of heart failure or if you experience any of the following symptoms:  Weight gain of 3 pounds or more overnight or 5 pounds in a week, increased swelling in our hands or feet or shortness of breath while lying flat in bed.   Please call your doctor as soon as you notice any of these symptoms; do not wait until your next office visit. Recognize signs and symptoms of STROKE:    F-face looks uneven    A-arms unable to move or move unevenly    S-speech slurred or non-existent    T-time-call 911 as soon as signs and symptoms begin-DO NOT go       Back to bed or wait to see if you get better-TIME IS BRAIN.

## 2017-10-26 ENCOUNTER — OFFICE VISIT (OUTPATIENT)
Dept: ORTHOPEDIC SURGERY | Age: 52
End: 2017-10-26

## 2017-10-26 VITALS
HEIGHT: 65 IN | SYSTOLIC BLOOD PRESSURE: 116 MMHG | OXYGEN SATURATION: 95 % | RESPIRATION RATE: 19 BRPM | DIASTOLIC BLOOD PRESSURE: 71 MMHG | BODY MASS INDEX: 31.12 KG/M2 | WEIGHT: 186.8 LBS | TEMPERATURE: 97.5 F | HEART RATE: 68 BPM

## 2017-10-26 DIAGNOSIS — M53.3 PAIN OF BOTH SACROILIAC JOINTS: ICD-10-CM

## 2017-10-26 DIAGNOSIS — G89.29 CHRONIC BILATERAL LOW BACK PAIN WITHOUT SCIATICA: Primary | ICD-10-CM

## 2017-10-26 DIAGNOSIS — M54.50 CHRONIC BILATERAL LOW BACK PAIN WITHOUT SCIATICA: Primary | ICD-10-CM

## 2017-10-26 RX ORDER — LIOTHYRONINE SODIUM 25 UG/1
TABLET ORAL
Refills: 1 | COMMUNITY
Start: 2017-10-05 | End: 2018-10-29 | Stop reason: DRUGHIGH

## 2017-10-26 RX ORDER — LEVOTHYROXINE SODIUM 150 UG/1
TABLET ORAL
Refills: 3 | COMMUNITY
Start: 2017-10-17 | End: 2017-12-19 | Stop reason: ALTCHOICE

## 2017-10-26 NOTE — MR AVS SNAPSHOT
Visit Information Date & Time Provider Department Dept. Phone Encounter #  
 10/26/2017  9:40 AM Cindy Naranjo NP [address] [facility] [phone] [id number] Follow-up Instructions Return for after block. Follow-up and Disposition History Your Appointments 12/19/2017 11:30 AM  
Follow Up with Tiffani Mei MD  
[facility]) Appt Note: 1 MO BLOCK FU; ARTIS 11/29/17  
 [address] [place] [address]  
[phone]  
  
   
 [address] [facility],[address] [facility]  
  
    
  
 11/3/2017  3:00 PM  
Any with Haja Viera MD  
[facility] [address] ([facility]) Appt Note: NP  
 [address] [facility] [address] Upcoming Health Maintenance Date Due Hepatitis C Screening 1965 Pneumococcal 19-64 Medium Risk (1 of 1 - PPSV23) 4/4/1983 DTaP/Tdap/Td series (1 - Tdap) 4/4/1985 PAP AKA CERVICAL CYTOLOGY 4/4/1985 BREAST CANCER SCRN MAMMOGRAM 4/4/2014 FOBT Q 1 YEAR AGE 50-75 4/4/2014 INFLUENZA AGE 9 TO ADULT 8/1/2017 Allergies as of 10/26/2017  Review Complete On: 10/26/2017 By: Cindy Naranjo NP Severity Noted Reaction Type Reactions Morphine  01/29/2013    Itching Naproxen  01/29/2013    Itching Swelling, hives, difficulty breathing Current Immunizations  Never Reviewed No immunizations on file. Not reviewed this visit You Were Diagnosed With   
  
 Codes Comments Chronic bilateral low back pain without sciatica    -  Primary ICD-10-CM: M54.5, G89.29 ICD-9-CM: 724.2, 338.29 Pain of both sacroiliac joints     ICD-10-CM: M53.3 ICD-9-CM: 724.6 Vitals BP Pulse Temp Resp Height(growth percentile) Weight(growth percentile)  116/71 68 97.5 °F (36.4 °C) (Oral) 19 5' 5\" (1.651 m) 186 lb 12.8 oz (84.7 kg) SpO2 BMI OB Status Smoking Status 95% 31.09 kg/m2 Hysterectomy Current Every Day Smoker BMI and BSA Data Body Mass Index Body Surface Area 31.09 kg/m 2 1.97 m 2 Preferred Pharmacy Pharmacy Name Phone [facility] [phone] Your Updated Medication List  
  
   
This list is accurate as of: 10/26/17 10:48 AM.  Always use your most recent med list.  
  
  
  
  
 ALPRAZolam 0.5 mg tablet Commonly known as:  Melene Reels TK 1 T PO ONCE A DAY PRN FOR ANXIETY. cyanocobalamin 1,000 mcg/mL injection Commonly known as:  VITAMIN B12 INJECT 1ML SUBCUTANEOUSLY EVERY 2 WEEKS DULoxetine 60 mg capsule Commonly known as:  CYMBALTA Take 1 Cap by mouth two (2) times a day. * levothyroxine 175 mcg tablet Commonly known as:  SYNTHROID Take 175 mcg by mouth Daily (before breakfast). * levothyroxine 150 mcg tablet Commonly known as:  SYNTHROID TK 1 T PO ONCE A DAY  
  
 liothyronine 25 mcg tablet Commonly known as:  CYTOMEL  
TK 1 T PO  QAM  
  
 methylPREDNISolone 4 mg tablet Commonly known as:  Paulo Freitas Per dose pack instructions * Notice: This list has 2 medication(s) that are the same as other medications prescribed for you. Read the directions carefully, and ask your doctor or other care provider to review them with you. We Performed the Following AMB POC XRAY, SPINE, LUMBOSACRAL; 2 O [61588 CPT(R)] SCHEDULE SURGERY [GEO6576 Custom] Follow-up Instructions Return for after block. Patient Instructions Back Pain: Care Instructions Your Care Instructions Back pain has many possible causes. It is often related to problems with muscles and ligaments of the back. It may also be related to problems with the nerves, discs, or bones of the back.  Moving, lifting, standing, sitting, or sleeping in an awkward way can strain the back. Sometimes you don't notice the injury until later. Arthritis is another common cause of back pain. Although it may hurt a lot, back pain usually improves on its own within several weeks. Most people recover in 12 weeks or less. Using good home treatment and being careful not to stress your back can help you feel better sooner. Follow-up care is a key part of your treatment and safety. Be sure to make and go to all appointments, and call your doctor if you are having problems. It's also a good idea to know your test results and keep a list of the medicines you take. How can you care for yourself at home? · Sit or lie in positions that are most comfortable and reduce your pain. Try one of these positions when you lie down: ¨ Lie on your back with your knees bent and supported by large pillows. ¨ Lie on the floor with your legs on the seat of a sofa or chair. Duayne Lark on your side with your knees and hips bent and a pillow between your legs. ¨ Lie on your stomach if it does not make pain worse. · Do not sit up in bed, and avoid soft couches and twisted positions. Bed rest can help relieve pain at first, but it delays healing. Avoid bed rest after the first day of back pain. · Change positions every 30 minutes. If you must sit for long periods of time, take breaks from sitting. Get up and walk around, or lie in a comfortable position. · Try using a heating pad on a low or medium setting for 15 to 20 minutes every 2 or 3 hours. Try a warm shower in place of one session with the heating pad. · You can also try an ice pack for 10 to 15 minutes every 2 to 3 hours. Put a thin cloth between the ice pack and your skin. · Take pain medicines exactly as directed. ¨ If the doctor gave you a prescription medicine for pain, take it as prescribed. ¨ If you are not taking a prescription pain medicine, ask your doctor if you can take an over-the-counter medicine. · Take short walks several times a day. You can start with 5 to 10 minutes, 3 or 4 times a day, and work up to longer walks. Walk on level surfaces and avoid hills and stairs until your back is better. · Return to work and other activities as soon as you can. Continued rest without activity is usually not good for your back. · To prevent future back pain, do exercises to stretch and strengthen your back and stomach. Learn how to use good posture, safe lifting techniques, and proper body mechanics. When should you call for help? Call your doctor now or seek immediate medical care if: 
? · You have new or worsening numbness in your legs. ? · You have new or worsening weakness in your legs. (This could make it hard to stand up.) ? · You lose control of your bladder or bowels. ? Watch closely for changes in your health, and be sure to contact your doctor if: 
? · Your pain gets worse. ? · You are not getting better after 2 weeks. Where can you learn more? Go to http://garry-neyda.info/. Enter A705 in the search box to learn more about \"Back Pain: Care Instructions. \" Current as of: March 21, 2017 Content Version: 11.4 © 4893-4316 [employer]. Care instructions adapted under license by [employer] (which disclaims liability or warranty for this information). If you have questions about a medical condition or this instruction, always ask your healthcare professional. [place] [address] any warranty or liability for your use of this information. Back Pain: Care Instructions Your Care Instructions Back pain has many possible causes. It is often related to problems with muscles and ligaments of the back. It may also be related to problems with the nerves, discs, or bones of the back. Moving, lifting, standing, sitting, or sleeping in an awkward way can strain the back.  Sometimes you don't notice the injury until later. Arthritis is another common cause of back pain. Although it may hurt a lot, back pain usually improves on its own within several weeks. Most people recover in 12 weeks or less. Using good home treatment and being careful not to stress your back can help you feel better sooner. Follow-up care is a key part of your treatment and safety. Be sure to make and go to all appointments, and call your doctor if you are having problems. It's also a good idea to know your test results and keep a list of the medicines you take. How can you care for yourself at home? · Sit or lie in positions that are most comfortable and reduce your pain. Try one of these positions when you lie down: ¨ Lie on your back with your knees bent and supported by large pillows. ¨ Lie on the floor with your legs on the seat of a sofa or chair. Jeovany Brazilian on your side with your knees and hips bent and a pillow between your legs. ¨ Lie on your stomach if it does not make pain worse. · Do not sit up in bed, and avoid soft couches and twisted positions. Bed rest can help relieve pain at first, but it delays healing. Avoid bed rest after the first day of back pain. · Change positions every 30 minutes. If you must sit for long periods of time, take breaks from sitting. Get up and walk around, or lie in a comfortable position. · Try using a heating pad on a low or medium setting for 15 to 20 minutes every 2 or 3 hours. Try a warm shower in place of one session with the heating pad. · You can also try an ice pack for 10 to 15 minutes every 2 to 3 hours. Put a thin cloth between the ice pack and your skin. · Take pain medicines exactly as directed. ¨ If the doctor gave you a prescription medicine for pain, take it as prescribed. ¨ If you are not taking a prescription pain medicine, ask your doctor if you can take an over-the-counter medicine. · Take short walks several times a day.  You can start with 5 to 10 minutes, 3 or 4 times a day, and work up to longer walks. Walk on level surfaces and avoid hills and stairs until your back is better. · Return to work and other activities as soon as you can. Continued rest without activity is usually not good for your back. · To prevent future back pain, do exercises to stretch and strengthen your back and stomach. Learn how to use good posture, safe lifting techniques, and proper body mechanics. When should you call for help? Call your doctor now or seek immediate medical care if: 
? · You have new or worsening numbness in your legs. ? · You have new or worsening weakness in your legs. (This could make it hard to stand up.) ? · You lose control of your bladder or bowels. ? Watch closely for changes in your health, and be sure to contact your doctor if: 
? · Your pain gets worse. ? · You are not getting better after 2 weeks. Where can you learn more? Go to http://garry-neyda.info/. Enter O902 in the search box to learn more about \"Back Pain: Care Instructions. \" Current as of: March 21, 2017 Content Version: 11.4 © 9656-8322 [employer]. Care instructions adapted under license by [employer] (which disclaims liability or warranty for this information). If you have questions about a medical condition or this instruction, always ask your healthcare professional. [address] any warranty or liability for your use of this information. Patient Instructions History Introducing \[facility]\"" & HEALTH SERVICES! Gabriel Luna introduces ClassOwl patient portal. Now you can access parts of your medical record, email your doctor's office, and request medication refills online. 1. In your internet browser, go to [email or web address] 2. Click on the First Time User? Click Here link in the Sign In box. You will see the New Member Sign Up page. 3. Enter your Avison Young Access Code exactly as it appears below. You will not need to use this code after youve completed the sign-up process. If you do not sign up before the expiration date, you must request a new code. · Avison Young Access Code: Natty Orellana Expires: 12/24/2017  8:54 AM 
 
4. Enter the last four digits of your Social Security Number (xxxx) and Date of Birth (mm/dd/yyyy) as indicated and click Submit. You will be taken to the next sign-up page. 5. Create a Percellot ID. This will be your Avison Young login ID and cannot be changed, so think of one that is secure and easy to remember. 6. Create a Avison Young password. You can change your password at any time. 7. Enter your Password Reset Question and Answer. This can be used at a later time if you forget your password. 8. Enter your e-mail address. You will receive e-mail notification when new information is available in [facility] [address]. 9. Click Sign Up. You can now view and download portions of your medical record. 10. Click the Download Summary menu link to download a portable copy of your medical information. If you have questions, please visit the Frequently Asked Questions section of the Avison Young website. Remember, Avison Young is NOT to be used for urgent needs. For medical emergencies, dial 911. Now available from your iPhone and Android! Please provide this summary of care documentation to your next provider. Your primary care clinician is listed as Rufino Angelo. If you have any questions after today's visit, please call [phone].

## 2017-10-26 NOTE — PATIENT INSTRUCTIONS

## 2017-10-26 NOTE — PROGRESS NOTES
Chief complaint/History of Present Illness:  Chief Complaint   Patient presents with    Back Pain     PAULA Arias Patient is a  47 y.o.  female      HISTORY OF PRESENT ILLNESS:  The patient comes in today for following her bilateral L4-5, L5-S1 facet block done on October 24, 2017. She states it helped a very little bit but not a whole lot. She is now having pain lower, more in the SI joint area to any radiating leg pain. She continues on Cymbalta 60 mg through her psychiatrist.  She remains on Social Security disability. She quit smoking her one-fourth pack per day habit last month. She is very happy with that. She states the Medrol Dosepak we gave her last time really did not help either. She still has not been to the urologist but has an upcoming appointment. She has urologic issues from a bladder tack she had years ago. She continues to rate her pain as a 4-5/10 most days. She denies fever or bowel or bladder dysfunction other than her pre-existing bladder issues. PHYSICAL EXAM:  Ms. Dennys Ibarra is a 58-year-old female. She is alert and oriented. She has a full weight bearing, non-antalgic gait. She has 4/5 strength of the bilateral lower extremities and negative straight leg raise. She is tender to her bilateral SI joints. ASSESSENT/PLAN:  This is a patient who is having low back pain. I think it might be coming from her SI joints. I placed paperclips over the maximal point of pain, and did an x-ray. It shows it to be over her SI joint. So, we are going to set her up for bilateral SI joint blocks. She did ask for pain medication. We are going to defer on that right now. Hopefully, the blocks will help, and she will not require any medication. We will see her back after the block.         Review of systems:    Past Medical History:   Diagnosis Date    Arthritis     Depressed 10/1/2015    Depression     Hypothyroidism     Myofascial muscle pain 10/1/2015    Narcolepsy  Psychiatric disorder     Sleep apnea     Unspecified sleep apnea     uses cpap machine     Past Surgical History:   Procedure Laterality Date    HX BLADDER SUSPENSION      HX CHOLECYSTECTOMY      HX HYSTERECTOMY      HX LUMBAR FUSION  2000    L4-L5-S1 decompression and fusion, Dr. Flakito Maier  2010    removal of segmental instrumentation, Dr. Baron Cooley  2014    and removed bursitis from right shoulder    HX ORTHOPAEDIC      Right Foot surgery    HX SHOULDER ARTHROSCOPY Right      Social History     Social History    Marital status: LEGALLY      Spouse name: N/A    Number of children: N/A    Years of education: N/A     Occupational History    Not on file. Social History Main Topics    Smoking status: Current Every Day Smoker     Packs/day: 0.50    Smokeless tobacco: Never Used    Alcohol use Yes      Comment: weekends and few times per week    Drug use: No    Sexual activity: Yes     Birth control/ protection: Surgical      Comment: Hysterectomy     Other Topics Concern    Not on file     Social History Narrative     Family History   Problem Relation Age of Onset    Cancer Mother     Heart Disease Father     Kidney Disease Father     Diabetes Father     No Known Problems Brother        Physical Exam:  Visit Vitals    /71    Pulse 68    Temp 97.5 °F (36.4 °C) (Oral)    Resp 19    Ht 5' 5\" (1.651 m)    Wt 186 lb 12.8 oz (84.7 kg)    SpO2 95%    BMI 31.09 kg/m2     Pain Scale: 5/10          has been . reviewed and is appropriate          Diagnoses and all orders for this visit:    1. Chronic bilateral low back pain without sciatica  -     AMB POC XRAY, SPINE, LUMBOSACRAL; 2 O  -     SCHEDULE SURGERY    2. Pain of both sacroiliac joints  -     AMB POC XRAY, SPINE, LUMBOSACRAL; 2 O  -     SCHEDULE SURGERY            Follow-up Disposition:  Return for after block.         We have informed Regan Caballero to notify us for immediate appointment if she has any worsening neurogical symptoms or if an emergency situation presents, then call 911

## 2017-11-29 ENCOUNTER — APPOINTMENT (OUTPATIENT)
Dept: GENERAL RADIOLOGY | Age: 52
End: 2017-11-29
Attending: PHYSICAL MEDICINE & REHABILITATION
Payer: MEDICARE

## 2017-11-29 ENCOUNTER — HOSPITAL ENCOUNTER (OUTPATIENT)
Age: 52
Setting detail: OUTPATIENT SURGERY
Discharge: HOME | End: 2017-11-29
Attending: PHYSICAL MEDICINE & REHABILITATION | Admitting: PHYSICAL MEDICINE & REHABILITATION
Payer: MEDICARE

## 2017-11-29 VITALS
WEIGHT: 186 LBS | HEART RATE: 86 BPM | OXYGEN SATURATION: 95 % | BODY MASS INDEX: 30.99 KG/M2 | TEMPERATURE: 97.7 F | SYSTOLIC BLOOD PRESSURE: 130 MMHG | DIASTOLIC BLOOD PRESSURE: 88 MMHG | RESPIRATION RATE: 16 BRPM | HEIGHT: 65 IN

## 2017-11-29 PROCEDURE — 74011250637 HC RX REV CODE- 250/637: Performed by: PHYSICAL MEDICINE & REHABILITATION

## 2017-11-29 PROCEDURE — 74011636320 HC RX REV CODE- 636/320

## 2017-11-29 PROCEDURE — 74011000250 HC RX REV CODE- 250

## 2017-11-29 PROCEDURE — 74011636320 HC RX REV CODE- 636/320: Performed by: PHYSICAL MEDICINE & REHABILITATION

## 2017-11-29 PROCEDURE — 74011250636 HC RX REV CODE- 250/636: Performed by: PHYSICAL MEDICINE & REHABILITATION

## 2017-11-29 PROCEDURE — 76010000009 HC PAIN MGT 0 TO 30 MIN PROC: Performed by: PHYSICAL MEDICINE & REHABILITATION

## 2017-11-29 PROCEDURE — 74011250636 HC RX REV CODE- 250/636

## 2017-11-29 PROCEDURE — 74011000250 HC RX REV CODE- 250: Performed by: PHYSICAL MEDICINE & REHABILITATION

## 2017-11-29 RX ORDER — DIAZEPAM 5 MG/1
5-20 TABLET ORAL ONCE
Status: DISCONTINUED | OUTPATIENT
Start: 2017-11-29 | End: 2017-11-29

## 2017-11-29 RX ORDER — DEXAMETHASONE SODIUM PHOSPHATE 100 MG/10ML
INJECTION INTRAMUSCULAR; INTRAVENOUS AS NEEDED
Status: DISCONTINUED | OUTPATIENT
Start: 2017-11-29 | End: 2017-11-29 | Stop reason: HOSPADM

## 2017-11-29 RX ORDER — LIDOCAINE HYDROCHLORIDE 10 MG/ML
INJECTION, SOLUTION EPIDURAL; INFILTRATION; INTRACAUDAL; PERINEURAL AS NEEDED
Status: DISCONTINUED | OUTPATIENT
Start: 2017-11-29 | End: 2017-11-29 | Stop reason: HOSPADM

## 2017-11-29 RX ORDER — DIAZEPAM 5 MG/1
5-20 TABLET ORAL ONCE
Status: COMPLETED | OUTPATIENT
Start: 2017-11-29 | End: 2017-11-29

## 2017-11-29 RX ADMIN — DIAZEPAM 10 MG: 5 TABLET ORAL at 12:59

## 2017-11-29 NOTE — PROCEDURES
Bi Procedure Note    Patient Name: En Fish    Date of Procedure: November 29, 2017    Preoperative Diagnosis: Bilateral Sacroiliac Joint Pain and Dysfunction    Post Operative Diagnosis: same    Procedure: SI Joint Injection bilateral      Consent: Informed consent was obtained prior to the procedure. The patient was given the opportunity to ask questions regarding the procedure and its associated risks. In addition to the potential risks associated with the procedure itself, the patient was informed both verbally and in writing of potential side effects of the use of glucocorticoids. The patient appeared to comprehend the informed consent and desired to have the procedure performed. Procedure: The patient was placed in the prone position on the flouroscopy table and the back was prepped and draped in the usual sterile manner. A #22 gauge spinal needle was then advanced to lie within the SI joint after local Lidocaine 1% injection. The procedure was repeated for the contralateral SI joint. A total of 30 mg of preservative free dexamethasone and 5 ml of Lidocaine was equally divided and introduced into SI joints. The injection area was cleaned and bandaids applied. No excessive bleeding was noted. Patient dressed and was discharged to home with instructions. Discussion:  (x) The patient tolerated the procedure well.     ( )       Magda Willson MD  November 29, 2017

## 2017-11-29 NOTE — DISCHARGE INSTRUCTIONS
[facility]   ([place]DR[place])  Dr. Allison Ortiz, Dr. Filipe Wood, Dr. Demond Moreno not drive a car, operate heavy machinery or dangerous equipment for 24 hours. * Activity as tolerated; rest for the remainder of the day. * Resume pre-block medications including those for your family doctor. * Do not drink alcoholic beverages for 24 hours. Alcohol and the medications you have received may interact and cause an adverse reaction. * You may feel better this evening and worse tomorrow, as the numbing medications wears off and the steroid has yet to begin to work. After 48 hrs the steroid should begin to release bringing you relief. * You may shower this evening and remove any bandages. * Avoid hot tubs and heating pads for 24 hours. You may use cold packs on the procedure site as tolerated for the first 24 hours. * If a headache develops, drink plenty of fluids and rest.  Take over the counter medications for headache if needed. If the headache continues longer than 24 hours, call MD at the [address] [place] [address]. [phone]    * Continue taking pain medications as needed. * You may resume your regular diet if tolerated. Otherwise, start with sips of water and advance slowly. * If Diabetic: check your blood sugar three times a day for the next 3 days. If your sugar is greater than 300 call your family doctor. If your sugar is greater than 400, have someone transport you to the nearest Emergency Room. * If you experience any of the following problems, Please Call the [address] [place] [address] at [phone].         * Shortness of Breath    * Fever of 101 or higher    * Nausea / Vomiting    * Severe Headache    * Weakness or numbness in arms or legs that is not      resolving    * Prolonged increase in pain greater than 4 days      DISCHARGE SUMMARY from Nurse      PATIENT INSTRUCTIONS:    After oral sedation, for 24 hours or while taking prescription Narcotics:  · Limit your activities  · Do not drive and operate hazardous machinery  · Do not make important personal or business decisions  · Do  not drink alcoholic beverages  · If you have not urinated within 8 hours after discharge, please contact your surgeon on call. Report the following to your surgeon:  · Excessive pain, swelling, redness or odor of or around the surgical area  · Temperature over 101  · Nausea and vomiting lasting longer than 4 hours or if unable to take medications  · Any signs of decreased circulation or nerve impairment to extremity: change in color, persistent  numbness, tingling, coldness or increase pain  · Any questions            What to do at Home:  Recommended activity: Activity as tolerated, NO DRIVING FOR 12 Hours post injection          *  Please give a list of your current medications to your Primary Care Provider. *  Please update this list whenever your medications are discontinued, doses are      changed, or new medications (including over-the-counter products) are added. *  Please carry medication information at all times in case of emergency situations. These are general instructions for a healthy lifestyle:    No smoking/ No tobacco products/ Avoid exposure to second hand smoke    Surgeon General's Warning:  Quitting smoking now greatly reduces serious risk to your health. Obesity, smoking, and sedentary lifestyle greatly increases your risk for illness    A healthy diet, regular physical exercise & weight monitoring are important for maintaining a healthy lifestyle    You may be retaining fluid if you have a history of heart failure or if you experience any of the following symptoms:  Weight gain of 3 pounds or more overnight or 5 pounds in a week, increased swelling in our hands or feet or shortness of breath while lying flat in bed.   Please call your doctor as soon as you notice any of these symptoms; do not wait until your next office visit. Recognize signs and symptoms of STROKE:    F-face looks uneven    A-arms unable to move or move unevenly    S-speech slurred or non-existent    T-time-call 911 as soon as signs and symptoms begin-DO NOT go       Back to bed or wait to see if you get better-TIME IS BRAIN. MyChart Activation    Thank you for requesting access to InEdge. Please follow the instructions below to securely access and download your online medical record. InEdge allows you to send messages to your doctor, view your test results, renew your prescriptions, schedule appointments, and more. How Do I Sign Up? 1. In your internet browser, go to [email or web address]  2. Click on the First Time User? Click Here link in the Sign In box. You will be redirect to the New Member Sign Up page. 3. Enter your InEdge Access Code exactly as it appears below. You will not need to use this code after youve completed the sign-up process. If you do not sign up before the expiration date, you must request a new code. InEdge Access Code: Sloan Self  Expires: 2017  7:54 AM (This is the date your InEdge access code will )    4. Enter the last four digits of your Social Security Number (xxxx) and Date of Birth (mm/dd/yyyy) as indicated and click Submit. You will be taken to the next sign-up page. 5. Create a InEdge ID. This will be your InEdge login ID and cannot be changed, so think of one that is secure and easy to remember. 6. Create a InEdge password. You can change your password at any time. 7. Enter your Password Reset Question and Answer. This can be used at a later time if you forget your password. 8. Enter your e-mail address. You will receive e-mail notification when new information is available in [address] 9. Click Sign Up. You can now view and download portions of your medical record.   10. Click the Download Summary menu link to download a portable copy of your medical information. Additional Information    If you have questions, please visit the Frequently Asked Questions section of the Code[employer] website at [email or web address] Remember, CodeSealer is NOT to be used for urgent needs. For medical emergencies, dial 911.

## 2017-11-29 NOTE — H&P
Date of Surgery Update:  Sadaf Dumont was seen and examined. History and physical has been reviewed. The patient has been examined. There have been no significant clinical changes since the completion of the last office visit.       Signed By: Neeta Lawrence MD     November 29, 2017 12:52 PM

## 2017-12-19 ENCOUNTER — OFFICE VISIT (OUTPATIENT)
Dept: ORTHOPEDIC SURGERY | Age: 52
End: 2017-12-19

## 2017-12-19 VITALS
HEART RATE: 82 BPM | WEIGHT: 203.8 LBS | BODY MASS INDEX: 33.95 KG/M2 | DIASTOLIC BLOOD PRESSURE: 77 MMHG | RESPIRATION RATE: 18 BRPM | HEIGHT: 65 IN | SYSTOLIC BLOOD PRESSURE: 131 MMHG

## 2017-12-19 DIAGNOSIS — M47.816 LUMBAR FACET ARTHROPATHY: ICD-10-CM

## 2017-12-19 DIAGNOSIS — M53.3 PAIN OF BOTH SACROILIAC JOINTS: Primary | ICD-10-CM

## 2017-12-19 DIAGNOSIS — M96.1 LUMBAR POST-LAMINECTOMY SYNDROME: ICD-10-CM

## 2017-12-19 DIAGNOSIS — M79.2 NEURITIS: ICD-10-CM

## 2017-12-19 DIAGNOSIS — M62.830 MUSCLE SPASM OF BACK: ICD-10-CM

## 2017-12-19 DIAGNOSIS — M53.3 PAIN OF BOTH SACROILIAC JOINTS: ICD-10-CM

## 2017-12-19 RX ORDER — NABUMETONE 750 MG/1
750 TABLET, FILM COATED ORAL
Qty: 60 TAB | Refills: 2 | Status: SHIPPED | OUTPATIENT
Start: 2017-12-19 | End: 2018-03-08

## 2017-12-19 RX ORDER — PREGABALIN 50 MG/1
CAPSULE ORAL
Qty: 21 CAP | Refills: 0 | Status: ON HOLD | OUTPATIENT
Start: 2017-12-19 | End: 2018-01-15

## 2017-12-19 RX ORDER — NABUMETONE 750 MG/1
TABLET, FILM COATED ORAL
Qty: 180 TAB | Refills: 2 | OUTPATIENT
Start: 2017-12-19

## 2017-12-19 NOTE — MR AVS SNAPSHOT
Visit Information Date & Time Provider Department Dept. Phone Encounter #  
 12/19/2017  3:15 PM Makenzie Nelson MD [facility] [phone] [id number] Follow-up Instructions Return in about 2 months (around 2/19/2018) for Medication follow up. Upcoming Health Maintenance Date Due Hepatitis C Screening 1965 DTaP/Tdap/Td series (1 - Tdap) 4/4/1985 PAP AKA CERVICAL CYTOLOGY 4/4/1985 FOBT Q 1 YEAR AGE 50-75 4/4/2014 Influenza Age 5 to Adult 8/1/2017 Allergies as of 12/19/2017  Review Complete On: 12/19/2017 By: Samantha Waters LPN Severity Noted Reaction Type Reactions Morphine  01/29/2013    Itching Naproxen  01/29/2013    Itching Swelling, hives, difficulty breathing Current Immunizations  Never Reviewed No immunizations on file. Not reviewed this visit You Were Diagnosed With   
  
 Codes Comments Pain of both sacroiliac joints    -  Primary ICD-10-CM: M53.3 ICD-9-CM: 724.6 Neuritis     ICD-10-CM: M79.2 ICD-9-CM: 729.2 Lumbar post-laminectomy syndrome     ICD-10-CM: M96.1 ICD-9-CM: 722.83 Muscle spasm of back     ICD-10-CM: G18.903 ICD-9-CM: 724.8 Lumbar facet arthropathy     ICD-10-CM: M12.88 ICD-9-CM: 721.3 Vitals BP Pulse Resp Height(growth percentile) Weight(growth percentile) BMI  
 131/77 82 18 5' 5\" (1.651 m) 203 lb 12.8 oz (92.4 kg) 33.91 kg/m2 OB Status Smoking Status Hysterectomy Former Smoker Vitals History BMI and BSA Data Body Mass Index Body Surface Area  
 33.91 kg/m 2 2.06 m 2 Preferred Pharmacy Pharmacy Name Phone [facility], [address] [place] [address] [phone] Your Updated Medication List  
  
   
This list is accurate as of: 12/19/17  4:40 PM.  Always use your most recent med list.  
  
  
  
  
 ALPRAZolam 0.5 mg tablet Commonly known as:  Imlay Hearing TK 1 T PO ONCE A DAY PRN FOR ANXIETY. cyanocobalamin 1,000 mcg/mL injection Commonly known as:  VITAMIN B12 INJECT 1ML SUBCUTANEOUSLY EVERY 1 WEEK DULoxetine 60 mg capsule Commonly known as:  CYMBALTA Take 1 Cap by mouth two (2) times a day. levothyroxine 175 mcg tablet Commonly known as:  SYNTHROID Take 175 mcg by mouth Daily (before breakfast). liothyronine 25 mcg tablet Commonly known as:  CYTOMEL  
TK 1 T PO  QAM  
  
 mirabegron ER 50 mg ER tablet Commonly known as:  MYRBETRIQ Take 1 Tab by mouth daily. nabumetone 750 mg tablet Commonly known as:  RELAFEN Take 1 Tab by mouth two (2) times daily as needed for Pain. Take with food  
  
 pregabalin 50 mg capsule Commonly known as:  Murtaza Flash 1 po tid for 7 days as directed Prescriptions Printed Refills  
 pregabalin (LYRICA) 50 mg capsule 0 Si po tid for 7 days as directed Class: Print Prescriptions Sent to Pharmacy Refills  
 nabumetone (RELAFEN) 750 mg tablet 2 Sig: Take 1 Tab by mouth two (2) times daily as needed for Pain. Take with food Class: Normal  
 Pharmacy: [address] [place] [address], [address] [place] [address] #: [phone] Route: Oral  
  
We Performed the Following REFERRAL TO PAIN MANAGEMENT [[id number] Custom] Comments:  
 Referral to Dr. Claudia Cavanaugh for SI RFA Follow-up Instructions Return in about 2 months (around 2018) for Medication follow up. Referral Information Referral ID Referred By Referred To  
  
 [id number] Saintclair Phenix, MD   
   [facility] [place], [address] Phone: [phone] Fax: [phone] Visits Status Start Date End Date 1 New Request 17 If your referral has a status of pending review or denied, additional information will be sent to support the outcome of this decision. Patient Instructions Low Back Arthritis: Exercises Your Care Instructions Here are some examples of typical rehabilitation exercises for your condition. Start each exercise slowly. Ease off the exercise if you start to have pain. Your doctor or physical therapist will tell you when you can start these exercises and which ones will work best for you. When you are not being active, find a comfortable position for rest. Some people are comfortable on the floor or a medium-firm bed with a small pillow under their head and another under their knees. Some people prefer to lie on their side with a pillow between their knees. Don't stay in one position for too long. Take short walks (10 to 20 minutes) every 2 to 3 hours. Avoid slopes, hills, and stairs until you feel better. Walk only distances you can manage without pain, especially leg pain. How to do the exercises Pelvic tilt 1. Lie on your back with your knees bent. 2. \"Brace\" your stomach-tighten your muscles by pulling in and imagining your belly button moving toward your spine. 3. Press your lower back into the floor. You should feel your hips and pelvis rock back. 4. Hold for 6 seconds while breathing smoothly. 5. Relax and allow your pelvis and hips to rock forward. 6. Repeat 8 to 12 times. Back stretches 1. Get down on your hands and knees on the floor. 2. Relax your head and allow it to droop. Round your back up toward the ceiling until you feel a nice stretch in your upper, middle, and lower back. Hold this stretch for as long as it feels comfortable, or about 15 to 30 seconds. 3. Return to the starting position with a flat back while you are on your hands and knees. 4. Let your back sway by pressing your stomach toward the floor. Lift your buttocks toward the ceiling. 5. Hold this position for 15 to 30 seconds. 6. Repeat 2 to 4 times. Follow-up care is a key part of your treatment and safety. Be sure to make and go to all appointments, and call your doctor if you are having problems. It's also a good idea to know your test results and keep a list of the medicines you take. Where can you learn more? Go to http://garry-neyda.info/. Enter P584 in the search box to learn more about \"Low Back Arthritis: Exercises. \" Current as of: March 21, 2017 Content Version: 11.4 © 5840-4462 "[employer]. Care instructions adapted under license by [employer] (which disclaims liability or warranty for this information). If you have questions about a medical condition or this instruction, always ask your healthcare professional. [address] any warranty or liability for your use of this information. Sacroiliac Pain: Exercises Your Care Instructions Here are some examples of typical rehabilitation exercises for your condition. Start each exercise slowly. Ease off the exercise if you start to have pain. Your doctor or physical therapist will tell you when you can start these exercises and which ones will work best for you. How to do the exercises Knee-to-chest stretch 7. Do not do the knee-to-chest exercise if it causes or increases back or leg pain. 8. Lie on your back with your knees bent and your feet flat on the floor. You can put a small pillow under your head and neck if it is more comfortable. 9. Grasp your hands under one knee and bring the knee to your chest, keeping the other foot flat on the floor. 10. Keep your lower back pressed to the floor. Hold for at least 15 to 30 seconds. 11. Relax and lower the knee to the starting position. Repeat with the other leg. 12. Repeat 2 to 4 times with each leg.  
13. To get more stretch, keep your other leg flat on the floor while pulling your knee to your chest. 
Bridging 7. Lie on your back with both knees bent. Your knees should be bent about 90 degrees. 8. Tighten your belly muscles by pulling in your belly button toward your spine. Then push your feet into the floor, squeeze your buttocks, and lift your hips off the floor until your shoulders, hips, and knees are all in a straight line. 9. Hold for about 6 seconds as you continue to breathe normally, and then slowly lower your hips back down to the floor and rest for up to 10 seconds. 10. Repeat 8 to 12 times. Hip extension 1. Get down on your hands and knees on the floor. 2. Keeping your back and neck straight, lift one leg straight out behind you. When you lift your leg, keep your hips level. Don't let your back twist, and don't let your hip drop toward the floor. 3. Hold for 6 seconds. Repeat 8 to 12 times with each leg. 4. If you feel steady and strong when you do this exercise, you can make it more difficult. To do this, when you lift your leg, also lift the opposite arm straight out in front of you. For example, lift the left leg and the right arm at the same time. (This is sometimes called the \"bird dog exercise. \") Hold for 6 seconds, and repeat 8 to 12 times on each side. Clamshell 1. Lie on your side with a pillow under your head. Keep your feet and knees together and your knees bent. 2. Raise your top knee, but keep your feet together. Do not let your hips roll back. Your legs should open up like a clamshell. 3. Hold for 6 seconds. 4. Slowly lower your knee back down. Rest for 10 seconds. 5. Repeat 8 to 12 times. 6. Switch to your other side and repeat steps 1 through 5. Hamstring wall stretch 1. Lie on your back in a doorway, with one leg through the open door. 2. Slide your affected leg up the wall to straighten your knee. You should feel a gentle stretch down the back of your leg. 1. Do not arch your back. 2. Do not bend either knee. 3. Keep one heel touching the floor and the other heel touching the wall. Do not point your toes. 3. Hold the stretch for at least 1 minute to begin. Then try to lengthen the time you hold the stretch to as long as 6 minutes. 4. Switch legs, and repeat steps 1 through 3. 
5. Repeat 2 to 4 times. 6. If you do not have a place to do this exercise in a doorway, there is another way to do it: 
7. Lie on your back, and bend one knee. 8. Loop a towel under the ball and toes of that foot, and hold the ends of the towel in your hands. 9. Straighten your knee, and slowly pull back on the towel. You should feel a gentle stretch down the back of your leg. 10. Switch legs, and repeat steps 1 through 3. 
11. Repeat 2 to 4 times. Lower abdominal strengthening 1. Lie on your back with your knees bent and your feet flat on the floor. 2. Tighten your belly muscles by pulling your belly button in toward your spine. 3. Lift one foot off the floor and bring your knee toward your chest, so that your knee is straight above your hip and your leg is bent like the letter \"L. \" 
4. Lift the other knee up to the same position. 5. Lower one leg at a time to the starting position. 6. Keep alternating legs until you have lifted each leg 8 to 12 times. 7. Be sure to keep your belly muscles tight and your back still as you are moving your legs. Be sure to breathe normally. Piriformis stretch 1. Lie on your back with your legs straight. 2. Lift your affected leg, and bend your knee. With your opposite hand, reach across your body, and then gently pull your knee toward your opposite shoulder. 3. Hold the stretch for 15 to 30 seconds. 4. Switch legs and repeat steps 1 through 3. 
5. Repeat 2 to 4 times. Follow-up care is a key part of your treatment and safety. Be sure to make and go to all appointments, and call your doctor if you are having problems.  It's also a good idea to know your test results and keep a list of the medicines you take. Where can you learn more? Go to http://garry-neyda.info/. Enter O345 in the search box to learn more about \"Sacroiliac Pain: Exercises. \" Current as of: March 21, 2017 Content Version: 11.4 © 4893-7290 [employer]orporated. Care instructions adapted under license by [employer] (which disclaims liability or warranty for this information). If you have questions about a medical condition or this instruction, always ask your healthcare professional. [address] any warranty or liability for your use of this information. Introducing [facility]! Farzana Alvarez introduces [employer] patient portal. Now you can access parts of your medical record, email your doctor's office, and request medication refills online. 1. In your internet browser, go to [email or web address] 2. Click on the First Time User? Click Here link in the Sign In box. You will see the New Member Sign Up page. 3. Enter your [employer] Access Code exactly as it appears below. You will not need to use this code after youve completed the sign-up process. If you do not sign up before the expiration date, you must request a new code. · [employer] Access Code: Reed Roman Expires: 12/24/2017  7:54 AM 
 
4. Enter the last four digits of your Social Security Number (xxxx) and Date of Birth (mm/dd/yyyy) as indicated and click Submit. You will be taken to the next sign-up page. 5. Create a AbsolutDatat ID. This will be your [employer] login ID and cannot be changed, so think of one that is secure and easy to remember. 6. Create a [employer] password. You can change your password at any time. 7. Enter your Password Reset Question and Answer. This can be used at a later time if you forget your password. 8. Enter your e-mail address. You will receive e-mail notification when new information is available in [address] 9. Click Sign Up. You can now view and download portions of your medical record. 10. Click the Download Summary menu link to download a portable copy of your medical information. If you have questions, please visit the Frequently Asked Questions section of the RingMD website. Remember, RingMD is NOT to be used for urgent needs. For medical emergencies, dial 911. Now available from your iPhone and Android! Please provide this summary of care documentation to your next provider. Your primary care clinician is listed as Juan Rousseau. If you have any questions after today's visit, please call [phone].

## 2017-12-19 NOTE — PROGRESS NOTES
[facility]  [address], [facility] [address]  Phone: [phone]  Fax: [phone]      ASSESSMENT   Diagnoses and all orders for this visit:    1. Pain of both sacroiliac joints  -     nabumetone (RELAFEN) 750 mg tablet; Take 1 Tab by mouth two (2) times daily as needed for Pain. Take with food  -     REFERRAL TO PAIN MANAGEMENT    2. Neuritis  -     pregabalin (LYRICA) 50 mg capsule; 1 po tid for 7 days as directed  -     REFERRAL TO PAIN MANAGEMENT    3. Lumbar post-laminectomy syndrome  -     pregabalin (LYRICA) 50 mg capsule; 1 po tid for 7 days as directed  -     nabumetone (RELAFEN) 750 mg tablet; Take 1 Tab by mouth two (2) times daily as needed for Pain. Take with food  -     REFERRAL TO PAIN MANAGEMENT    4. Muscle spasm of back  -     REFERRAL TO PAIN MANAGEMENT    5. Lumbar facet arthropathy  -     nabumetone (RELAFEN) 750 mg tablet; Take 1 Tab by mouth two (2) times daily as needed for Pain. Take with food  -     REFERRAL TO PAIN MANAGEMENT         IMPRESSION AND PLAN:  Lisa Bowling is a 47 y.o. female with history of chronic lumbar pain. She tried a bilateral sacroiliac joint injection on 11/29/2017 and a bilateral L4-5 and bilateral L5-S1 facet injection on 10/04/2017 with minimal relief. Pt complains of pain in the lower back that extends into the buttocks and hips and admits to the recent onset of burning numbness in the toes. She did not tolerate Neurontin and has a history of renal stones (therefore, not a candidate for Topamax)    1) Pt was given information on lumbar arthritis and sacroiliac exercises. 2) Discussed treatment options with the Pt, including medication, a sacroiliac RFA, and a spinal cord stimulator; prefer RFA first before any other methold  3) Pt was prescribed Relafen 750 mg 1 tab BID prn pain with food.   4) She was prescribed Lyrica 50 mg 1 tab TID, tapering up as directed ( a sample for 1 week)  5) Pt was referred to Dr. Marek Fischer for sacroiliac RFA evaluation. 6) She was given information on a spinal cord stimulator. 7) Ms. Miguel A Calderon has a reminder for a \"due or due soon\" health maintenance. I have asked that she contact her primary care provider, Iman Celestin MD, for follow-up on this health maintenance. 8)  demonstrated consistency with prescribing. 9) Pt will follow-up in 2 months or sooner if needed. HISTORY OF PRESENT ILLNESS:    Regan Caballero is a 47 y.o. female with history of chronic lumbar pain. She tried a bilateral sacroiliac joint injection on 11/29/2017 and a bilateral L4-5 and bilateral L5-S1 facet injection on 10/04/2017 with minimal relief. Pt complains of pain in the lower back that extends into the buttocks and hips. She reports experiencing severe pain when waking and states that her pain is worse at night the longer she is weight bearing throughout the day. Pt admits to the recent onset of burning numbness in the toes. She states that the pain interferes with her quality of life. Pt admits to increased pain when she tries to exercise on the treadmill. She experienced minimal relief mason trying a Medrol Dosepak. Pt has a history of renal stones and has tried Topamax in the past. She also experienced blisters in the mouth when trying Neurontin. Of note, patient is allergic to naproxen but she is able to tolerate other antiinflammatories. She denies any prior gastric surgery and has never tried Relafen. Pt admits to minimal relief when trying Voltaren in the past. Pt at this time desires to proceed a referral to Dr. Marek Fischer for a sacroiliac joint RFA, medication evaluation, and a spinal cord stimulator. Of note, Pt has quit smoking.      Pain Scale: 6/10    PCP: Iman Celestin MD       Past Medical History:   Diagnosis Date    Arthritis     Depressed 10/1/2015    Depression     Hypothyroidism     Incontinence     Myofascial muscle pain 10/1/2015    Narcolepsy     Psychiatric disorder     Sleep apnea     Unspecified sleep apnea     uses cpap machine        Social History     Social History    Marital status: LEGALLY      Spouse name: N/A    Number of children: N/A    Years of education: N/A     Occupational History    Not on file. Social History Main Topics    Smoking status: Former Smoker     Packs/day: 0.50     Quit date: 09/2017    Smokeless tobacco: Never Used    Alcohol use Yes      Comment: weekends and few times per week    Drug use: No    Sexual activity: Yes     Birth control/ protection: Surgical      Comment: Hysterectomy     Other Topics Concern    Not on file     Social History Narrative       Current Outpatient Prescriptions   Medication Sig Dispense Refill    pregabalin (LYRICA) 50 mg capsule 1 po tid for 7 days as directed 21 Cap 0    nabumetone (RELAFEN) 750 mg tablet Take 1 Tab by mouth two (2) times daily as needed for Pain. Take with food 60 Tab 2    mirabegron ER (MYRBETRIQ) 50 mg ER tablet Take 1 Tab by mouth daily. 90 Tab 3    liothyronine (CYTOMEL) 25 mcg tablet TK 1 T PO  QAM  1    ALPRAZolam (XANAX) 0.5 mg tablet TK 1 T PO ONCE A DAY PRN FOR ANXIETY. 1    cyanocobalamin (VITAMIN B12) 1,000 mcg/mL injection INJECT 1ML SUBCUTANEOUSLY EVERY 1 WEEK  2    DULoxetine (CYMBALTA) 60 mg capsule Take 1 Cap by mouth two (2) times a day. (Patient taking differently: Take 60 mg by mouth daily.) 60 Cap 0    levothyroxine (SYNTHROID) 175 mcg tablet Take 175 mcg by mouth Daily (before breakfast). Allergies   Allergen Reactions    Morphine Itching    Naproxen Itching     Swelling, hives, difficulty breathing         REVIEW OF SYSTEMS    Constitutional: Negative for fever, chills, or weight change. Respiratory: Negative for cough or shortness of breath. Cardiovascular: Negative for chest pain or palpitations. Gastrointestinal: Negative for acid reflux, change in bowel habits, or constipation.   Genitourinary: Negative for dysuria and flank pain. Musculoskeletal: Positive for lumbar pain. Skin: Negative for rash. Neurological: Negative for headaches, dizziness, or numbness. Endo/Heme/Allergies: Negative for increased bruising. Psychiatric/Behavioral: Negative for difficulty with sleep. PHYSICAL EXAMINATION  Visit Vitals    /77    Pulse 82    Resp 18    Ht 5' 5\" (1.651 m)    Wt 203 lb 12.8 oz (92.4 kg)    BMI 33.91 kg/m2       Constitutional: Awake, alert, and in no acute distress. Neurological: 1+ symmetrical DTRs in the lower extremities. Sensation to light touch is intact. Skin: warm, dry, and intact. Musculoskeletal: Tenderness to palpation in the lower lumbar region and over the sacroiliac joints. Pain with extension, axial loading, and forward flexion. No pain with internal or external rotation of her hips. Negative straight leg raise bilaterally. Hip Flex  Quads Hamstrings Ankle DF EHL Ankle PF   Right +4/5 +4/5 +4/5 +4/5 +4/5 +4/5   Left +4/5 +4/5 +4/5 +4/5 +4/5 +4/5     IMAGING:    Lumbar spine 4V x-rays from 04/12/2017 were personally reviewed with the Pt and demonstrated:  1) Dextroscoliosis . 2) Significant bony hypertrophy at L5-S1 bilaterally. 3) Anterior fusion L5-S1. 4) Mild calcification in the aorta. 5) Disc replacement at L5-S1. 6) Significant facet arthropathy at L4-5.   7) Mild degenerative discs at L1 and L3.  8) No instability.      Knee x-rays from 04/12/2017 were personally reviewed with the Pt and demonstrated:  1) Normal joint space bilaterally. 2) No significant degenerative changes in the left knee.      Cervical spine MRI from 06/04/2013 was personally reviewed with the Pt and demonstrated:  Results from Hospital Encounter on 06/04/13   MRI CERV SPINE WO CONT     Narrative Sagittal and axial multisequence MR images of cervical spine were obtained. Normal alignment.  No compression fracture or pathologic marrow signal. No  prevertebral soft tissue abnormalities. Craniocervical junction is normal.  Spinal cord shows normal signal intensity and morphology. C2-C3: No disc herniation, central or foraminal stenosis. C3-C4: No disc herniation, central or foraminal stenosis. C4-C5: Diffuse posterior disc bulge with small left paracentral disc  protrusion, slightly contacting spinal cord with no cord compression or edema. No central stenosis. No foraminal stenosis. C5-C6: Mild posterior disc bulge with no central or foraminal stenosis. C6-C7: Mild posterior disc bulge with no central stenosis. Mild left foraminal  stenosis. Patent right foramen. C7-T1: Unremarkable.              Impression Impression: Minimal degenerative disc disease, most prominent at C4-C5 as  described.             Lumbar spine MRI from 11/07/2012 was personally reviewed with the Pt and demonstrated:  Results from Hospital Encounter on 11/07/12   MRI LUMB SPINE W WO CONT     Narrative Sagittal and axial multisequence MR images of lumbar spine were obtained  without and with 20 cc Magnevist gadolinium IV contrast.    No prior studies. Significant motion artifacts throughout. Disc spacer at L4-L5 and L5-S1 with  anterior anchoring screw L5-S1. There is mild anterior spondylolisthesis of L5. Posterior soft tissue artifacts in lower back. No compression fracture or  pathologic marrow signal. No abnormal enhancement. Likely small cyst in the  left kidney. Conus medullaris ends at L1 with normal morphology and signal  intensity. No abnormal enhancement. No epidural collection. L1-L2: Unremarkable. L2-L3: Minimal posterior disc bulge. No central or foraminal stenosis. Tiny  bilateral facet joint effusion. L3-L4: No disc herniation. Facet hypertrophy with mild ligamentous hypertrophy. No central stenosis. No foraminal stenosis. L4-L5: No disc material. Prominent anterior epidural fat but no central  stenosis, thecal sac compression or distortion.  No suggestion of arachnoiditis. Patent neural foramina. Bony bridging along the facets, likely from previous  surgery. L5-S1: Prominent epidural fat. No disc material. No thecal sac compression. No  significant foraminal stenosis or exiting L5 nerve root compression. Small  perineuronal cysts in the sacral segment. Inflammation seen in L5-S1 facet  joints              Impression Impression: Postoperative change as above. No central stenosis or foraminal  stenosis. No disc herniation. Facet inflammation at L5- S1. Written by Jacque Ryan, as dictated by Brooklyn Caldera MD.  I, Dr. Brooklyn Caldera confirm that all documentation is accurate.

## 2017-12-19 NOTE — PATIENT INSTRUCTIONS
Low Back Arthritis: Exercises  Your Care Instructions  Here are some examples of typical rehabilitation exercises for your condition. Start each exercise slowly. Ease off the exercise if you start to have pain. Your doctor or physical therapist will tell you when you can start these exercises and which ones will work best for you. When you are not being active, find a comfortable position for rest. Some people are comfortable on the floor or a medium-firm bed with a small pillow under their head and another under their knees. Some people prefer to lie on their side with a pillow between their knees. Don't stay in one position for too long. Take short walks (10 to 20 minutes) every 2 to 3 hours. Avoid slopes, hills, and stairs until you feel better. Walk only distances you can manage without pain, especially leg pain. How to do the exercises  Pelvic tilt    1. Lie on your back with your knees bent. 2. \"Brace\" your stomach-tighten your muscles by pulling in and imagining your belly button moving toward your spine. 3. Press your lower back into the floor. You should feel your hips and pelvis rock back. 4. Hold for 6 seconds while breathing smoothly. 5. Relax and allow your pelvis and hips to rock forward. 6. Repeat 8 to 12 times. Back stretches    1. Get down on your hands and knees on the floor. 2. Relax your head and allow it to droop. Round your back up toward the ceiling until you feel a nice stretch in your upper, middle, and lower back. Hold this stretch for as long as it feels comfortable, or about 15 to 30 seconds. 3. Return to the starting position with a flat back while you are on your hands and knees. 4. Let your back sway by pressing your stomach toward the floor. Lift your buttocks toward the ceiling. 5. Hold this position for 15 to 30 seconds. 6. Repeat 2 to 4 times. Follow-up care is a key part of your treatment and safety.  Be sure to make and go to all appointments, and call your doctor if you are having problems. It's also a good idea to know your test results and keep a list of the medicines you take. Where can you learn more? Go to http://garry-neyda.info/. Enter T084 in the search box to learn more about \"Low Back Arthritis: Exercises. \"  Current as of: March 21, 2017  Content Version: 11.4  © 6142-9389 [employer]. Care instructions adapted under license by [employer] (which disclaims liability or warranty for this information). If you have questions about a medical condition or this instruction, always ask your healthcare professional. [place] [address] any warranty or liability for your use of this information. Sacroiliac Pain: Exercises  Your Care Instructions  Here are some examples of typical rehabilitation exercises for your condition. Start each exercise slowly. Ease off the exercise if you start to have pain. Your doctor or physical therapist will tell you when you can start these exercises and which ones will work best for you. How to do the exercises  Knee-to-chest stretch    7. Do not do the knee-to-chest exercise if it causes or increases back or leg pain. 8. Lie on your back with your knees bent and your feet flat on the floor. You can put a small pillow under your head and neck if it is more comfortable. 9. Grasp your hands under one knee and bring the knee to your chest, keeping the other foot flat on the floor. 10. Keep your lower back pressed to the floor. Hold for at least 15 to 30 seconds. 11. Relax and lower the knee to the starting position. Repeat with the other leg. 12. Repeat 2 to 4 times with each leg. 13. To get more stretch, keep your other leg flat on the floor while pulling your knee to your chest.  Bridging    7. Lie on your back with both knees bent. Your knees should be bent about 90 degrees. 8. Tighten your belly muscles by pulling in your belly button toward your spine.  Then push your feet into the floor, squeeze your buttocks, and lift your hips off the floor until your shoulders, hips, and knees are all in a straight line. 9. Hold for about 6 seconds as you continue to breathe normally, and then slowly lower your hips back down to the floor and rest for up to 10 seconds. 10. Repeat 8 to 12 times. Hip extension    1. Get down on your hands and knees on the floor. 2. Keeping your back and neck straight, lift one leg straight out behind you. When you lift your leg, keep your hips level. Don't let your back twist, and don't let your hip drop toward the floor. 3. Hold for 6 seconds. Repeat 8 to 12 times with each leg. 4. If you feel steady and strong when you do this exercise, you can make it more difficult. To do this, when you lift your leg, also lift the opposite arm straight out in front of you. For example, lift the left leg and the right arm at the same time. (This is sometimes called the \"bird dog exercise. \") Hold for 6 seconds, and repeat 8 to 12 times on each side. Clamshell    1. Lie on your side with a pillow under your head. Keep your feet and knees together and your knees bent. 2. Raise your top knee, but keep your feet together. Do not let your hips roll back. Your legs should open up like a clamshell. 3. Hold for 6 seconds. 4. Slowly lower your knee back down. Rest for 10 seconds. 5. Repeat 8 to 12 times. 6. Switch to your other side and repeat steps 1 through 5. Hamstring wall stretch    1. Lie on your back in a doorway, with one leg through the open door. 2. Slide your affected leg up the wall to straighten your knee. You should feel a gentle stretch down the back of your leg. 1. Do not arch your back. 2. Do not bend either knee. 3. Keep one heel touching the floor and the other heel touching the wall. Do not point your toes. 3. Hold the stretch for at least 1 minute to begin.  Then try to lengthen the time you hold the stretch to as long as 6 minutes. 4. Switch legs, and repeat steps 1 through 3.  5. Repeat 2 to 4 times. 6. If you do not have a place to do this exercise in a doorway, there is another way to do it:  7. Lie on your back, and bend one knee. 8. Loop a towel under the ball and toes of that foot, and hold the ends of the towel in your hands. 9. Straighten your knee, and slowly pull back on the towel. You should feel a gentle stretch down the back of your leg. 10. Switch legs, and repeat steps 1 through 3.  11. Repeat 2 to 4 times. Lower abdominal strengthening    1. Lie on your back with your knees bent and your feet flat on the floor. 2. Tighten your belly muscles by pulling your belly button in toward your spine. 3. Lift one foot off the floor and bring your knee toward your chest, so that your knee is straight above your hip and your leg is bent like the letter \"L. \"  4. Lift the other knee up to the same position. 5. Lower one leg at a time to the starting position. 6. Keep alternating legs until you have lifted each leg 8 to 12 times. 7. Be sure to keep your belly muscles tight and your back still as you are moving your legs. Be sure to breathe normally. Piriformis stretch    1. Lie on your back with your legs straight. 2. Lift your affected leg, and bend your knee. With your opposite hand, reach across your body, and then gently pull your knee toward your opposite shoulder. 3. Hold the stretch for 15 to 30 seconds. 4. Switch legs and repeat steps 1 through 3.  5. Repeat 2 to 4 times. Follow-up care is a key part of your treatment and safety. Be sure to make and go to all appointments, and call your doctor if you are having problems. It's also a good idea to know your test results and keep a list of the medicines you take. Where can you learn more? Go to http://garry-neyda.info/. Enter J695 in the search box to learn more about \"Sacroiliac Pain: Exercises. \"  Current as of: March 21, 2017  Content Version: 11.4  © 2703-9330 [employer]. Care instructions adapted under license by [employer] (which disclaims liability or warranty for this information). If you have questions about a medical condition or this instruction, always ask your healthcare professional. [address] any warranty or liability for your use of this information.

## 2017-12-20 PROBLEM — F33.9 RECURRENT DEPRESSION (HCC): Status: ACTIVE | Noted: 2017-12-20

## 2017-12-20 NOTE — TELEPHONE ENCOUNTER
We are just starting this medication -- not sure if it will be helpful or if she will be able to tolerate it. Will have more information after using it for 1 month.

## 2017-12-21 NOTE — TELEPHONE ENCOUNTER
I called the patient and she didn't request a 3 mo supply and didn't know that Mel did this. I explained to her that Dr. Jef Hector is  trying her on this for a mo first to see if it is helping and if she can tlerate it before giving anymore of it. Patient understands and stated that is fine.

## 2018-01-10 ENCOUNTER — OFFICE VISIT (OUTPATIENT)
Dept: PAIN MANAGEMENT | Age: 53
End: 2018-01-10

## 2018-01-10 VITALS
HEIGHT: 65 IN | DIASTOLIC BLOOD PRESSURE: 78 MMHG | SYSTOLIC BLOOD PRESSURE: 116 MMHG | WEIGHT: 203 LBS | RESPIRATION RATE: 14 BRPM | TEMPERATURE: 98.4 F | HEART RATE: 80 BPM | BODY MASS INDEX: 33.82 KG/M2

## 2018-01-10 DIAGNOSIS — M47.816 LUMBAR SPONDYLOSIS: ICD-10-CM

## 2018-01-10 DIAGNOSIS — M47.817 SPONDYLOSIS OF LUMBOSACRAL REGION WITHOUT MYELOPATHY OR RADICULOPATHY: ICD-10-CM

## 2018-01-10 DIAGNOSIS — M96.1 LUMBAR POSTLAMINECTOMY SYNDROME: ICD-10-CM

## 2018-01-10 DIAGNOSIS — M47.816 LUMBAR FACET ARTHROPATHY: ICD-10-CM

## 2018-01-10 DIAGNOSIS — G89.4 CHRONIC PAIN SYNDROME: ICD-10-CM

## 2018-01-10 DIAGNOSIS — M51.36 LUMBAR DEGENERATIVE DISC DISEASE: ICD-10-CM

## 2018-01-10 DIAGNOSIS — M46.1 SACROILIITIS (HCC): Primary | ICD-10-CM

## 2018-01-10 RX ORDER — GABAPENTIN 100 MG/1
CAPSULE ORAL 3 TIMES DAILY
COMMUNITY
End: 2018-10-29

## 2018-01-10 NOTE — PROGRESS NOTES
[facility]  Interventional Pain Management Consultation History & Physical    PATIENT NAME:  Prasanna Jensen OF BIRTH:   1965    DATE OF SERVICE:   1/10/2018      CHIEF COMPLAINT:  LOW BACK PAIN      REASON FOR VISIT:   Torito Foster presents to the pain clinic today for initial evaluation and to consider interventional pain management options as indicated for the type and location of the pain the patient is presenting with. HISTORY OF PRESENT ILLNESS:      This is an initial evaluation and consideration for interventional procedures regarding this patient. She is referred to us by Dr. Fernanda Chawla for consideration for sacroiliac radiofrequency neurotomy procedures as indicated per      At today's evaluation, patient endorses chronic low back pain many years duration. She states that she has actually had 3 previous lumbar spine surgical procedures. She is currently noted to have lumbar fusion bilaterally L5 -S1 levels. She endorses low back pain and bilateral posterior buttocks pain. She endorses this pain as aching throbbing burning sharp shooting pain. Pain travels throughout the low back and buttocks areas. She denies radiating leg pain or radicular symptoms. Her pain symptoms are increased with prolonged sitting standing or walking. Pain symptoms are also increased with twisting and turning as well as extension of her lumbar spine. She takes medications including opioid and non-opioid regimens, that are not helping. She is tried several courses of physical therapy in the past that have not helped either. Her pain is becoming quite refractory. She has had previous steroid injections versus other interventional pain injections in the past, these initially helped her but they are no longer helping per patient endorsement.       By review of available medical records, progress note dated December 19, 2017 by Dr. Fernanda Chawla is reviewed. Lumbar postlaminectomy syndrome, lumbar facet arthropathy, bilateral sacroiliac joint dysfunction, bilateral sacroiliac arthropathy. She has not been able to take Neurontin or Topamax. She has been prescribed Lyrica Relafen. Patient is also apparently being considered for spinal cord stimulator per Dr. Memo Snowden notes. Patient has more recently tried bilateral sacroiliac injections November 29, 2017. She had bilateral L4-5 and L5-S1 facet injections October 4, 2017. Minimal relief with the facet injections. She did get relief with the SI joint injections. Pain is increasing over time. Pain is becoming more unbearable for her, interfering with the quality of her life. She is tried exercising on a treadmill however pain interferes to this. Plain x-rays lumbar spine from April 12, 2017 demonstrate dextroscoliosis, significant bone hypertrophy L5-S1 bilaterally. Anterior fusion L5-S1. Disc displacement L5-S1. Facet arthropathy L4-5. Degenerative disc disease L1-L3 levels. Lumbar MRI November 7, 2012 is reviewed. Disc spacers L4-5 and L5-S1 anchoring screw L5-S1. No significant canal or neuroforaminal stenosis is noted. Facet inflammation L5-S1. Patient states that her low back pain is gotten worse especially since her last operation in 2009. My last lumbar imaging is November 7, 2012. ASSESSMENT/OPTIONS: as follows. We discussed options. Patient presents with signs and symptoms, as well as exam and imaging evidence suggestive of long-standing lumbar postlaminectomy syndrome. She also likely has symptomatology from lumbar degenerative disc disease, lumbar spondylotic changes. She is further symptomatic for bilateral sacroiliac arthropathy and sacroiliac joint dysfunction. I have explained to her risk and benefits, indications contraindications and side effects of sacroiliac radiofrequency neurotomy procedures.   She is amenable to proceed with this, she understands this procedure. In this regard, I am in agreement with Dr. Michael Pope and that I believe the patient has symptomatology suggestive of sacroiliac joint dysfunction. We have also described in great detail today possible spinal cord stimulator trial and placement. I have used skeleton spine model as well as  spinal cord stimulator device to describe this procedure in great detail today. I described the risk and benefits, indications contraindications and side effects the procedure. Patient is amenable to proceed with this if sacroiliac radiofrequency procedures did not help her. I will obtain an updated lumbar MRI possible anticipation of this procedure. I will have to send a consult to our pain psychologist Dr. Disha Alegre if we are going to proceed with spinal cord stimulator trial.  For now, we will proceed with sacroiliac radiofrequency neurotomy procedures. I have discussed the risks and benefits, indications, contraindications, and side effects of intended procedure with the patient. I have used skeleton spine model to describe and discuss the procedure with the patient. I have answered all questions relating to the procedure. Patient understands the nature of the procedure and wishes to proceed. Patient has no further questions. MRI Results (most recent):    Results from [place] encounter on 13   MRI SHOULDER RT WO CONT   Narrative Procedure: MRI of the right shoulder without contrast    CPT code: 16800    Indications: Pain and decreased range of motion    Comparisons: None    Procedure:    MRI of the shoulder was performed without intravenous or intra-articular  contrast. Sequences included: Localizer, axial PD, axial T2 with fat  saturation, coronal T1, coronal T2 with fat saturation, coronal inversion  recovery, sagittal T1, and sagittal T2 with fat saturation. Findings:      1. No os acromiale.  Moderate amount of marrow edema within the  acromioclavicular joint, suggesting active inflammation. No significant fluid  within the joint, but edema in the adjacent soft tissues. No fracture. Mild  marrow edema along the anterior humerus near the bicipital groove, likely  degenerative. 2.  The glenohumeral cartilaginous surfaces are intact. 3.  Trace glenohumeral joint fluid. 4.  Mild edema in the subacromial, subdeltoid bursa. 5.  The teres minor muscle is normal. The subscapularis tendon is intact. The  infraspinatus tendon looks normal. There is thickening and signal abnormality  within the subscapularis tendon consistent with moderate tendinosis. Small  amount of fluid tracks along a portion of the central myotendinous junction  suggesting small interstitial tear. No muscle atrophy. 6.  Glenoid labrum is intact throughout its course within limitation of no  intra-articular fluid. 7. The biceps tendon is intact. Impression IMPRESSION:      Moderate amount of marrow edema within the acromioclavicular joint, suggesting  active inflammation. No significant fluid within the joint, but edema in the  adjacent soft tissues. Findings are most likely due to overuse/developing  osteoarthrosis, but correlate clinically to exclude other processes such as  inflammatory arthropathy, infection, or hyperparathyroidism. Edema in the  adjacent subacromial, subdeltoid bursa is most likely reactive. Thickening and signal abnormality within the subscapularis tendon consistent  with moderate tendinosis. Small amount of fluid tracks along a portion of the  central myotendinous junction suggesting small interstitial tear. Mild marrow edema along the anterior humerus near the bicipital groove, likely  degenerative. Trace glenohumeral joint fluid. PAST MEDICAL HISTORY:   The patient  has a past medical history of Arthritis; Depressed (10/1/2015); Depression; Hypothyroidism;  Incontinence; Myofascial muscle pain (10/1/2015); Narcolepsy; Psychiatric disorder; Sleep apnea; and Unspecified sleep apnea. PAST SURGICAL HISTORY:   The patient  has a past surgical history that includes hx cholecystectomy; hx hysterectomy; hx mohs procedure (2014); hx bladder suspension; hx lumbar fusion (2000); hx lumbar fusion (2010); hx orthopaedic; and hx shoulder arthroscopy (Right). CURRENT MEDICATIONS:   The patient has a current medication list which includes the following prescription(s): gabapentin, pregabalin, nabumetone, mirabegron er, liothyronine, cyanocobalamin, levothyroxine, alprazolam, and duloxetine. ALLERGIES:     Allergies   Allergen Reactions    Morphine Itching    Naproxen Itching     Swelling, hives, difficulty breathing       FAMILY HISTORY:   The patient family history includes Cancer in her mother; Diabetes in her father; Heart Disease in her father; Kidney Disease in her father; No Known Problems in her brother. SOCIAL HISTORY:   The patient  reports that she quit smoking about 4 months ago. She smoked 0.50 packs per day. She has never used smokeless tobacco. The patient  reports that she drinks alcohol. She also  reports that she does not use illicit drugs. REVIEW OF SYSTEMS:    The patient denies fever, chills, weight loss (Constitutional), rash, itching (Skin), tinnitus, congestion (HENT), blurred vision, photophobia (Eyes), palpitations, orthopnea (Cardiovascular), hemoptysis, wheezing (Respiratory), nausea, vomiting, diarrhea (Gastrointestinal), dysuria, hematuria, urgency (Genitourinary), bowel or bladder incontinence, loss of consciousness (Neurologic), suicidal or homicidal ideation or hallucinations (Psychiatric). Denies swelling, axillary or groin masses (Lymphatic).            PHYSICAL EXAM:  VS:   Visit Vitals    /78 (BP 1 Location: Left arm, BP Patient Position: Sitting)    Pulse 80    Temp 98.4 °F (36.9 °C) (Oral)    Resp 14    Ht 5' 5\" (1.651 m)    Wt 92.1 kg (203 lb)    BMI 33.78 kg/m2     General: Well-developed and well-nourished. Body habitus consistent with recorded height and weight and the calculated BMI. Apparent distress due to low back and bilateral buttocks pain. Head: Normocephalic, atraumatic. Skin: Inspection of the skin reveals no rashes, lesions or infection. CV: Regular rate. No murmurs or rubs noted. No peripheral edema noted. Pulm: Respirations are even and unlabored. Extr: No clubbing, cyanosis, or edema noted. Musculoskeletal:  1. Cervical spine - Full ROM. No paraspinous tenderness at any level. There is no scoliosis, asymmetry, or musculoskeletal defect. 2. Thoracic spine - Full ROM. No paraspinous tenderness at any level. There is no scoliosis, asymmetry, or musculoskeletal defect. 3. Lumbar spine -decreased range of motion all axes . Paraspinous tenderness throughout the lumbar and sacral spine. SI joints are tender bilaterally; positive Byron's, positive Gaenslen's, positive sacral distraction, positive sacroiliac compression test.  There is no scoliosis, asymmetry, or musculoskeletal defect. 4. Right upper extremity - Full ROM. 5/5 muscle strength in all muscle groups. No pain or tenderness in shoulder, elbow, wrist, or hand. 5. Left upper extremity - Full ROM. 5/5 muscle strength in all muscle groups. No pain or tenderness in shoulder, elbow, wrist, or hand. 6. Right lower extremity - Full ROM. 5/5 muscle strength in all muscle groups. No pain, tenderness, or swelling in the hip, knee, ankle or foot. 7. Left lower extremity - Full ROM. 5/5 muscle strength in all muscle groups. No pain, tenderness, or swelling in the hip, knee, ankle or foot. Neurological:  1. Mental Status - Alert, awake and oriented. Speech is clear and appropriate. 2. Cranial Nerves - Extraocular muscles intact bilaterally. Cranial nerves II-XII grossly intact bilaterally. 3. Gait - antalgic   4. Reflexes - 2+ and symmetric throughout.   5. Sensation - Intact to light touch and pin prick. 6. Provocative Tests - Straight leg raise negative bilaterally. Psychological:  1. Mood and affect - Appropriate. 2. Speech - Appropriate. 3. Though content - Appropriate. 4. Judgment - Appropriate. ASSESSMENT:      ICD-10-CM ICD-9-CM    1. Sacroiliitis (HCC) M46.1 720.2 MRI LUMB SPINE W WO CONT   2. Spondylosis of lumbosacral region without myelopathy or radiculopathy M47.817 721.3 MRI LUMB SPINE W WO CONT   3. Chronic pain syndrome G89.4 338.4 MRI LUMB SPINE W WO CONT   4. Lumbar postlaminectomy syndrome M96.1 722.83 MRI LUMB SPINE W WO CONT   5. Lumbar degenerative disc disease M51.36 722. [address] CONT   6. Lumbar facet arthropathy M12.88 721.3 MRI LUMB SPINE W WO CONT   7. Lumbar spondylosis M47.816 721.3 MRI LUMB SPINE W WO CONT           PLAN:    1.    I have thoroughly discussed the risks and benefits, indications, contraindications, and side effects of any and procedures that were mentioned at today's patient visit. I have used a skeleton model to explain all procedures, as well as to provide added emphasis regarding procedures and as well for patient education purposes. I have answered all questions in great detail, and I have obtained verbal confirmation for all procedures planned with the patient. 3.    I have reviewed in great detail today the patient's MRI and other imaging studies with the patient. I have explained to the patient their condition using both actual recent and relevant images insofar as I am able to obtain actual images. I have used a skeleton model for added emphasis as well as patient education. 4.    I have advised patient to have a primary care provider continue to care for their health maintenance and general medical conditions. 5,    I have placed appropriate referrals to specialty care providers as I have deemed necessary through today's clinical consultation with the patient.   5.    I have explained to the patient that if any significant side effects, issues, problems, concerns, or perceived complications may have arisen at around the time of the patient's procedures, they should either call the pain management clinic or go to the emergency room immediately for medical provider evaluation. 6.   I have encouraged all patients to call the pain management clinic with any questions or concerns that they may have pertaining to their procedures. DISPOSITION:   The patients condition and plan were discussed at length and all questions were answered. The patient agrees with the plan. A total of 45 minutes was spent with the patient of which over half of the time was spent counseling the patient. Kamala Chi MD 1/10/2018 1:25 PM    Note: Although these clinic notes were documented by the provider at the time of the exam, they have not been proofed and are subject to transcription variance.

## 2018-01-10 NOTE — PATIENT INSTRUCTIONS

## 2018-01-11 RX ORDER — DIAZEPAM 5 MG/1
10 TABLET ORAL ONCE
Status: CANCELLED | OUTPATIENT
Start: 2018-01-15 | End: 2018-01-15

## 2018-01-11 RX ORDER — SODIUM CHLORIDE 0.9 % (FLUSH) 0.9 %
5-10 SYRINGE (ML) INJECTION AS NEEDED
Status: CANCELLED | OUTPATIENT
Start: 2018-01-11

## 2018-01-15 ENCOUNTER — APPOINTMENT (OUTPATIENT)
Dept: GENERAL RADIOLOGY | Age: 53
End: 2018-01-15
Attending: PHYSICAL MEDICINE & REHABILITATION
Payer: MEDICARE

## 2018-01-15 ENCOUNTER — HOSPITAL ENCOUNTER (OUTPATIENT)
Age: 53
Setting detail: OUTPATIENT SURGERY
Discharge: HOME | End: 2018-01-15
Attending: PHYSICAL MEDICINE & REHABILITATION | Admitting: PHYSICAL MEDICINE & REHABILITATION
Payer: MEDICARE

## 2018-01-15 VITALS
WEIGHT: 203 LBS | BODY MASS INDEX: 33.82 KG/M2 | OXYGEN SATURATION: 97 % | TEMPERATURE: 98.2 F | DIASTOLIC BLOOD PRESSURE: 94 MMHG | HEART RATE: 90 BPM | RESPIRATION RATE: 15 BRPM | SYSTOLIC BLOOD PRESSURE: 146 MMHG | HEIGHT: 65 IN

## 2018-01-15 PROCEDURE — 74011250637 HC RX REV CODE- 250/637: Performed by: PHYSICAL MEDICINE & REHABILITATION

## 2018-01-15 PROCEDURE — 74011250636 HC RX REV CODE- 250/636: Performed by: PHYSICAL MEDICINE & REHABILITATION

## 2018-01-15 PROCEDURE — 74011250636 HC RX REV CODE- 250/636

## 2018-01-15 PROCEDURE — 77030003672 HC NDL SPN HALY -A: Performed by: PHYSICAL MEDICINE & REHABILITATION

## 2018-01-15 PROCEDURE — 74011000250 HC RX REV CODE- 250

## 2018-01-15 PROCEDURE — 76010000009 HC PAIN MGT 0 TO 30 MIN PROC: Performed by: PHYSICAL MEDICINE & REHABILITATION

## 2018-01-15 RX ORDER — DIAZEPAM 5 MG/1
5-20 TABLET ORAL ONCE
Status: COMPLETED | OUTPATIENT
Start: 2018-01-15 | End: 2018-01-15

## 2018-01-15 RX ORDER — ROPIVACAINE HYDROCHLORIDE 2 MG/ML
INJECTION, SOLUTION EPIDURAL; INFILTRATION; PERINEURAL AS NEEDED
Status: DISCONTINUED | OUTPATIENT
Start: 2018-01-15 | End: 2018-01-15 | Stop reason: HOSPADM

## 2018-01-15 RX ADMIN — DIAZEPAM 10 MG: 5 TABLET ORAL at 07:23

## 2018-01-15 NOTE — DISCHARGE INSTRUCTIONS
[facility]      Post Procedures Instructions    *Resume Diet and Activity as tolerated. Rest for the remainder of the day. *You may fell worse before you feel better as the numbing medications wear off before the steroids take effect if used for your procedures. *Do not use affected extremity until numbness or loss of sensation has completely resolved without assistance. *DO NOT DRIVE, operate machinery/heavey equipment for 24 hours. *DO NOT DRINK ALCOHOL for 24 hours as it may interact with the sedation if you received it and also thins your blood and may cause you to bleed. *WAIT 24 hours before starting back ANY Blood thinning medications:   (Heparin, Coumadin, Warfarin, Lovenox, Plavix, Aggrenox)    *Resume Pre-Procedure Medications as prescribed except Blood Thinners unless directed by your Physician or Cardiologist.     *Avoid Hot tubs and Heating pad for 24 hours to prevent dissipation of medications, you may shower to remove bandages and remaining prep residue on the skin. * If you develop a Headache, drink plenty of fluids including beverages with caffeine (Coffee, Mt. Dew etc.) and rest.  If the headache persists longer than 24 hoursor intensifies - Please call [facility]) [phone]    * If you are DIABETIC, check your blood sugar three times a day for the next three days, the steroids will increase your blood sugar. If your blood sugar is greater than 400 have someone drive you to the nearest [address]. * If you experience any of the following problems, call the [facility] [phone] between 8:00 am - 4:30pm or After Hours [phone].     Shortness of breath    Fever of 101 F or higher    Nausea / Vomiting (not normal to you)    Increasing stiffness in the neck    Weakness or numbness in the arms or legs that is not resolving    Prolonged and increasing pain > than 4 days    ANYTHING OUT of the ORDINARY TO YOU    If YOU are experiencing a severe reaction / complication that you have never had before post procedure, call 911 or go to the nearest emergency room! All patients must have a  for transportation [place] regardless if you do or do not receive sedation. DISCHARGE SUMMARY from Nurse      PATIENT INSTRUCTIONS:    After Oral  or intravenous sedation, for 24 hours or while taking prescription Narcotics:  · Limit your activities  · Do not drive and operate hazardous machinery  · Do not make important personal or business decisions  · Do  not drink alcoholic beverages  · If you have not urinated within 8 hours after discharge, please contact your surgeon on call. Report the following to your surgeon:  · Excessive pain, swelling, redness or odor of or around the surgical area  · Temperature over 101  · Nausea and vomiting lasting longer than 4 hours or if unable to take medications  · Any signs of decreased circulation or nerve impairment to extremity: change in color, persistent  numbness, tingling, coldness or increase pain  · Any questions        What to do at Home:  Recommended activity: Activity as tolerated, NO DRIVING FOR 24 Hours post injection          *  Please give a list of your current medications to your Primary Care Provider. *  Please update this list whenever your medications are discontinued, doses are      changed, or new medications (including over-the-counter products) are added. *  Please carry medication information at all times in case of emergency situations. These are general instructions for a healthy lifestyle:    No smoking/ No tobacco products/ Avoid exposure to second hand smoke    Surgeon General's Warning:  Quitting smoking now greatly reduces serious risk to your health.     Obesity, smoking, and sedentary lifestyle greatly increases your risk for illness    A healthy diet, regular physical exercise & weight monitoring are important for maintaining a healthy lifestyle    You may be retaining fluid if you have a history of heart failure or if you experience any of the following symptoms:  Weight gain of 3 pounds or more overnight or 5 pounds in a week, increased swelling in our hands or feet or shortness of breath while lying flat in bed. Please call your doctor as soon as you notice any of these symptoms; do not wait until your next office visit. Recognize signs and symptoms of STROKE:    F-face looks uneven    A-arms unable to move or move unevenly    S-speech slurred or non-existent    T-time-call 911 as soon as signs and symptoms begin-DO NOT go       Back to bed or wait to see if you get better-TIME IS BRAIN.

## 2018-01-15 NOTE — H&P (VIEW-ONLY)
[address] [facility]  Interventional Pain Management Consultation History & Physical    PATIENT NAME:  Zion Munoz OF BIRTH:   1965    DATE OF SERVICE:   1/10/2018      CHIEF COMPLAINT:  LOW BACK PAIN      REASON FOR VISIT:   Esperanza Menezes presents to the pain clinic today for initial evaluation and to consider interventional pain management options as indicated for the type and location of the pain the patient is presenting with. HISTORY OF PRESENT ILLNESS:      This is an initial evaluation and consideration for interventional procedures regarding this patient. She is referred to us by Dr. Elizabeth Sandhu for consideration for sacroiliac radiofrequency neurotomy procedures as indicated per      At today's evaluation, patient endorses chronic low back pain many years duration. She states that she has actually had 3 previous lumbar spine surgical procedures. She is currently noted to have lumbar fusion bilaterally L5 -S1 levels. She endorses low back pain and bilateral posterior buttocks pain. She endorses this pain as aching throbbing burning sharp shooting pain. Pain travels throughout the low back and buttocks areas. She denies radiating leg pain or radicular symptoms. Her pain symptoms are increased with prolonged sitting standing or walking. Pain symptoms are also increased with twisting and turning as well as extension of her lumbar spine. She takes medications including opioid and non-opioid regimens, that are not helping. She is tried several courses of physical therapy in the past that have not helped either. Her pain is becoming quite refractory. She has had previous steroid injections versus other interventional pain injections in the past, these initially helped her but they are no longer helping per patient endorsement.       By review of available medical records, progress note dated December 19, 2017 by Dr. Elizabeth Sandhu is reviewed. Lumbar postlaminectomy syndrome, lumbar facet arthropathy, bilateral sacroiliac joint dysfunction, bilateral sacroiliac arthropathy. She has not been able to take Neurontin or Topamax. She has been prescribed Lyrica Relafen. Patient is also apparently being considered for spinal cord stimulator per Dr. Christine Sanchez notes. Patient has more recently tried bilateral sacroiliac injections November 29, 2017. She had bilateral L4-5 and L5-S1 facet injections October 4, 2017. Minimal relief with the facet injections. She did get relief with the SI joint injections. Pain is increasing over time. Pain is becoming more unbearable for her, interfering with the quality of her life. She is tried exercising on a treadmill however pain interferes to this. Plain x-rays lumbar spine from April 12, 2017 demonstrate dextroscoliosis, significant bone hypertrophy L5-S1 bilaterally. Anterior fusion L5-S1. Disc displacement L5-S1. Facet arthropathy L4-5. Degenerative disc disease L1-L3 levels. Lumbar MRI November 7, 2012 is reviewed. Disc spacers L4-5 and L5-S1 anchoring screw L5-S1. No significant canal or neuroforaminal stenosis is noted. Facet inflammation L5-S1. Patient states that her low back pain is gotten worse especially since her last operation in 2009. My last lumbar imaging is November 7, 2012. ASSESSMENT/OPTIONS: as follows. We discussed options. Patient presents with signs and symptoms, as well as exam and imaging evidence suggestive of long-standing lumbar postlaminectomy syndrome. She also likely has symptomatology from lumbar degenerative disc disease, lumbar spondylotic changes. She is further symptomatic for bilateral sacroiliac arthropathy and sacroiliac joint dysfunction. I have explained to her risk and benefits, indications contraindications and side effects of sacroiliac radiofrequency neurotomy procedures.   She is amenable to proceed with this, she understands this procedure. In this regard, I am in agreement with Dr. Carol Talamantes and that I believe the patient has symptomatology suggestive of sacroiliac joint dysfunction. We have also described in great detail today possible spinal cord stimulator trial and placement. I have used skeleton spine model as well as  spinal cord stimulator device to describe this procedure in great detail today. I described the risk and benefits, indications contraindications and side effects the procedure. Patient is amenable to proceed with this if sacroiliac radiofrequency procedures did not help her. I will obtain an updated lumbar MRI possible anticipation of this procedure. I will have to send a consult to our pain psychologist Dr. Don Sampson if we are going to proceed with spinal cord stimulator trial.  For now, we will proceed with sacroiliac radiofrequency neurotomy procedures. I have discussed the risks and benefits, indications, contraindications, and side effects of intended procedure with the patient. I have used skeleton spine model to describe and discuss the procedure with the patient. I have answered all questions relating to the procedure. Patient understands the nature of the procedure and wishes to proceed. Patient has no further questions. MRI Results (most recent):    Results from [place] encounter on 13   MRI SHOULDER RT WO CONT   Narrative Procedure: MRI of the right shoulder without contrast    CPT code: 88341    Indications: Pain and decreased range of motion    Comparisons: None    Procedure:    MRI of the shoulder was performed without intravenous or intra-articular  contrast. Sequences included: Localizer, axial PD, axial T2 with fat  saturation, coronal T1, coronal T2 with fat saturation, coronal inversion  recovery, sagittal T1, and sagittal T2 with fat saturation. Findings:      1. No os acromiale.  Moderate amount of marrow edema within the  acromioclavicular joint, suggesting active inflammation. No significant fluid  within the joint, but edema in the adjacent soft tissues. No fracture. Mild  marrow edema along the anterior humerus near the bicipital groove, likely  degenerative. 2.  The glenohumeral cartilaginous surfaces are intact. 3.  Trace glenohumeral joint fluid. 4.  Mild edema in the subacromial, subdeltoid bursa. 5.  The teres minor muscle is normal. The subscapularis tendon is intact. The  infraspinatus tendon looks normal. There is thickening and signal abnormality  within the subscapularis tendon consistent with moderate tendinosis. Small  amount of fluid tracks along a portion of the central myotendinous junction  suggesting small interstitial tear. No muscle atrophy. 6.  Glenoid labrum is intact throughout its course within limitation of no  intra-articular fluid. 7. The biceps tendon is intact. Impression IMPRESSION:      Moderate amount of marrow edema within the acromioclavicular joint, suggesting  active inflammation. No significant fluid within the joint, but edema in the  adjacent soft tissues. Findings are most likely due to overuse/developing  osteoarthrosis, but correlate clinically to exclude other processes such as  inflammatory arthropathy, infection, or hyperparathyroidism. Edema in the  adjacent subacromial, subdeltoid bursa is most likely reactive. Thickening and signal abnormality within the subscapularis tendon consistent  with moderate tendinosis. Small amount of fluid tracks along a portion of the  central myotendinous junction suggesting small interstitial tear. Mild marrow edema along the anterior humerus near the bicipital groove, likely  degenerative. Trace glenohumeral joint fluid. PAST MEDICAL HISTORY:   The patient  has a past medical history of Arthritis; Depressed (10/1/2015); Depression; Hypothyroidism;  Incontinence; Myofascial muscle pain (10/1/2015); Narcolepsy; Psychiatric disorder; Sleep apnea; and Unspecified sleep apnea. PAST SURGICAL HISTORY:   The patient  has a past surgical history that includes hx cholecystectomy; hx hysterectomy; hx mohs procedure (2014); hx bladder suspension; hx lumbar fusion (2000); hx lumbar fusion (2010); hx orthopaedic; and hx shoulder arthroscopy (Right). CURRENT MEDICATIONS:   The patient has a current medication list which includes the following prescription(s): gabapentin, pregabalin, nabumetone, mirabegron er, liothyronine, cyanocobalamin, levothyroxine, alprazolam, and duloxetine. ALLERGIES:     Allergies   Allergen Reactions    Morphine Itching    Naproxen Itching     Swelling, hives, difficulty breathing       FAMILY HISTORY:   The patient family history includes Cancer in her mother; Diabetes in her father; Heart Disease in her father; Kidney Disease in her father; No Known Problems in her brother. SOCIAL HISTORY:   The patient  reports that she quit smoking about 4 months ago. She smoked 0.50 packs per day. She has never used smokeless tobacco. The patient  reports that she drinks alcohol. She also  reports that she does not use illicit drugs. REVIEW OF SYSTEMS:    The patient denies fever, chills, weight loss (Constitutional), rash, itching (Skin), tinnitus, congestion (HENT), blurred vision, photophobia (Eyes), palpitations, orthopnea (Cardiovascular), hemoptysis, wheezing (Respiratory), nausea, vomiting, diarrhea (Gastrointestinal), dysuria, hematuria, urgency (Genitourinary), bowel or bladder incontinence, loss of consciousness (Neurologic), suicidal or homicidal ideation or hallucinations (Psychiatric). Denies swelling, axillary or groin masses (Lymphatic).            PHYSICAL EXAM:  VS:   Visit Vitals    /78 (BP 1 Location: Left arm, BP Patient Position: Sitting)    Pulse 80    Temp 98.4 °F (36.9 °C) (Oral)    Resp 14    Ht 5' 5\" (1.651 m)    Wt 92.1 kg (203 lb)    BMI 33.78 kg/m2     General: Well-developed and well-nourished. Body habitus consistent with recorded height and weight and the calculated BMI. Apparent distress due to low back and bilateral buttocks pain. Head: Normocephalic, atraumatic. Skin: Inspection of the skin reveals no rashes, lesions or infection. CV: Regular rate. No murmurs or rubs noted. No peripheral edema noted. Pulm: Respirations are even and unlabored. Extr: No clubbing, cyanosis, or edema noted. Musculoskeletal:  1. Cervical spine  Full ROM. No paraspinous tenderness at any level. There is no scoliosis, asymmetry, or musculoskeletal defect. 2. Thoracic spine  Full ROM. No paraspinous tenderness at any level. There is no scoliosis, asymmetry, or musculoskeletal defect. 3. Lumbar spine decreased range of motion all axes . Paraspinous tenderness throughout the lumbar and sacral spine. SI joints are tender bilaterally; positive Byron's, positive Gaenslen's, positive sacral distraction, positive sacroiliac compression test.  There is no scoliosis, asymmetry, or musculoskeletal defect. 4. Right upper extremity  Full ROM. 5/5 muscle strength in all muscle groups. No pain or tenderness in shoulder, elbow, wrist, or hand. 5. Left upper extremity  Full ROM. 5/5 muscle strength in all muscle groups. No pain or tenderness in shoulder, elbow, wrist, or hand. 6. Right lower extremity  Full ROM. 5/5 muscle strength in all muscle groups. No pain, tenderness, or swelling in the hip, knee, ankle or foot. 7. Left lower extremity  Full ROM. 5/5 muscle strength in all muscle groups. No pain, tenderness, or swelling in the hip, knee, ankle or foot. Neurological:  1. Mental Status - Alert, awake and oriented. Speech is clear and appropriate. 2. Cranial Nerves - Extraocular muscles intact bilaterally. Cranial nerves II-XII grossly intact bilaterally. 3. Gait - antalgic   4. Reflexes - 2+ and symmetric throughout.   5. Sensation - Intact to light touch and pin prick. 6. Provocative Tests - Straight leg raise negative bilaterally. Psychological:  1. Mood and affect  Appropriate. 2. Speech  Appropriate. 3. Though content  Appropriate. 4. Judgment  Appropriate. ASSESSMENT:      ICD-10-CM ICD-9-CM    1. Sacroiliitis (HCC) M46.1 720.2 MRI LUMB SPINE W WO CONT   2. Spondylosis of lumbosacral region without myelopathy or radiculopathy M47.817 721.3 MRI LUMB SPINE W WO CONT   3. Chronic pain syndrome G89.4 338.4 MRI LUMB SPINE W WO CONT   4. Lumbar postlaminectomy syndrome M96.1 722.83 MRI LUMB SPINE W WO CONT   5. Lumbar degenerative disc disease M51.36 722. [address] CONT   6. Lumbar facet arthropathy M12.88 721.3 MRI LUMB SPINE W WO CONT   7. Lumbar spondylosis M47.816 721.3 MRI LUMB SPINE W WO CONT           PLAN:    1.    I have thoroughly discussed the risks and benefits, indications, contraindications, and side effects of any and procedures that were mentioned at today's patient visit. I have used a skeleton model to explain all procedures, as well as to provide added emphasis regarding procedures and as well for patient education purposes. I have answered all questions in great detail, and I have obtained verbal confirmation for all procedures planned with the patient. 3.    I have reviewed in great detail today the patient's MRI and other imaging studies with the patient. I have explained to the patient their condition using both actual recent and relevant images insofar as I am able to obtain actual images. I have used a skeleton model for added emphasis as well as patient education. 4.    I have advised patient to have a primary care provider continue to care for their health maintenance and general medical conditions. 5,    I have placed appropriate referrals to specialty care providers as I have deemed necessary through today's clinical consultation with the patient.   5.    I have explained to the patient that if any significant side effects, issues, problems, concerns, or perceived complications may have arisen at around the time of the patient's procedures, they should either call the pain management clinic or go to the emergency room immediately for medical provider evaluation. 6.   I have encouraged all patients to call the pain management clinic with any questions or concerns that they may have pertaining to their procedures. DISPOSITION:   The patients condition and plan were discussed at length and all questions were answered. The patient agrees with the plan. A total of 45 minutes was spent with the patient of which over half of the time was spent counseling the patient. Veda Pepper MD 1/10/2018 1:25 PM    Note: Although these clinic notes were documented by the provider at the time of the exam, they have not been proofed and are subject to transcription variance.

## 2018-01-15 NOTE — INTERVAL H&P NOTE
H&P Update:  Shay Cowan was seen and examined. History and physical has been reviewed. The patient has been examined.  There have been no significant clinical changes since the completion of the originally dated History and Physical.    Signed By: Magda June MD     January 15, 2018 7:42 AM

## 2018-01-15 NOTE — PROCEDURES
[facility] [place] [facility]    LUMBAR DORSAL RAMUS AND LATERAL BRANCH BLOCKS   PROCEDURE REPORT      PATIENT:  Tam Plant OF BIRTH:  1965  DATE OF SERVICE:  1/15/2018  [facility]    PRE-PROCEDURE DIAGNOSIS:  See Above    POST-PROCEDURE DIAGNOSIS:  See Above                PROCEDURE:  1. Bilateral diagnostic lumbar facet injection (via the L5 medial branch nerve block) (13244, 50  )  2. Bilateral S1, S2 and S3 dorsal rami lateral branch nerve blocks (64450 x3)  3. Fluoroscopic needle guidance, spinal (for the lateral branch blocks) (41824)      ANESTHESIA:  Local with oral sedation. See Medication Administration Record for specific medications and dosage. COMPLICATIONS: None. PHYSICIAN:  Magaly Walker MD    PRE-PROCEDURE NOTE:  Pre-procedural assessment of the patient was performed including a limited history and physical examination. The details of the procedure were discussed with the patient, including the risks, benefits and alternative options and an informed consent was obtained. The patients NPO status, if necessary for the specific procedure and/or administration of moderate intravenous sedation, if utilized, and availability of a responsible adult to escort the patient following the procedure were confirmed. PROCEDURE NOTE:  The patient was brought to the procedure suite and positioned on the fluoroscopy table in the prone position. Physiologic monitors were applied and supplemental oxygen was administered via nasal cannula. The skin was prepped in the standard surgical fashion and sterile drapes were applied over the procedure site. Please refer to the Flowsheet for documentation of the patients vital signs and the Medication Administration Report for any oral sedation administered prior to or during the procedure. 1% Lidocaine was utilized for local anesthesia.  Under AP fluoroscopic guidance a 22-gauge, 3-1/2 inch short bevel spinal needle was advanced to the superior margin of the sacral ala to block the L5 medial branch nerve(s). Following this, a 22-gauge, 3-1/2 inch short bevel spinal needle was advanced to the lateral aspect of the S1 neural foramen and repeated in the same fashion at the S2 and S3 neural foramina. After all needles were placed, 1 mL of 0.2% ropivacaine was injected at each location after the negative aspiration of blood, air or CSF. The needles were removed and the stilets were replaced. The procedure was performed on the contralateral side in the same fashion and at the same levels using the same volume of local anesthetic following negative aspiration of blood, air or CSF. The needles were removed intact. The area was thoroughly cleaned and sterile bandages applied as necessary. The patient tolerated the procedure well and vital signs remained stable throughout the procedure. The patient was assessed immediately following the procedure and was noted to have greater than 50% reduction in pain (a reduction from 7/10 to 2/10 in severity of lumbar pain). Based on these results, this procedure will be repeated to assess if the patient is an appropriate candidate for radiofrequency ablation of the above-mentioned medial branch nerves. POST-PROCEDURE COURSE:   The patient was escorted from the procedure suite in satisfactory condition and recovered per facility protocol based on the type of procedure performed and/or the sedation utilized. The patient did not experience any adverse events and remained hemodynamically stable during the post-procedure period. DISCHARGE NOTE:  Upon discharge, the patient was able to tolerate fluids and was in no acute distress. The patient was oriented to person, place and time and vital signs were stable.  Appropriate post-procedure instructions were provided and explained to the patient in detail and all questions were answered.     Magaly Walker MD 1/15/2018 11:48 AM

## 2018-01-17 ENCOUNTER — TELEPHONE (OUTPATIENT)
Dept: PAIN MANAGEMENT | Age: 53
End: 2018-01-17

## 2018-01-17 NOTE — TELEPHONE ENCOUNTER
Ms. Kalpesh Reese was contacted for follow-up status post Bilateral diagnostic lumbar facet injection (via the L5 medial branch nerve block) and Bilateral S1, S2 and S3 dorsal rami lateral branch nerve blocks on January 15, 2018.  She reports:    Pre-procedure numerical pain score: 7/10  Post-procedure numerical pain score immediately after: 2/10  Duration of relief post-procedure (if applicable): 2 hrs  Improvement in functional activities (if applicable): Yes  Percentage of overall improvement: 60%    COMMENTS:

## 2018-01-24 RX ORDER — DIAZEPAM 5 MG/1
5-20 TABLET ORAL ONCE
Status: CANCELLED | OUTPATIENT
Start: 2018-01-29 | End: 2018-01-29

## 2018-01-24 RX ORDER — SODIUM CHLORIDE 0.9 % (FLUSH) 0.9 %
5-10 SYRINGE (ML) INJECTION AS NEEDED
Status: CANCELLED | OUTPATIENT
Start: 2018-01-24

## 2018-01-29 ENCOUNTER — HOSPITAL ENCOUNTER (OUTPATIENT)
Age: 53
Setting detail: OUTPATIENT SURGERY
Discharge: HOME | End: 2018-01-29
Attending: PHYSICAL MEDICINE & REHABILITATION | Admitting: PHYSICAL MEDICINE & REHABILITATION
Payer: MEDICARE

## 2018-01-29 ENCOUNTER — APPOINTMENT (OUTPATIENT)
Dept: GENERAL RADIOLOGY | Age: 53
End: 2018-01-29
Attending: PHYSICAL MEDICINE & REHABILITATION
Payer: MEDICARE

## 2018-01-29 VITALS
WEIGHT: 203 LBS | BODY MASS INDEX: 33.82 KG/M2 | HEART RATE: 97 BPM | SYSTOLIC BLOOD PRESSURE: 98 MMHG | OXYGEN SATURATION: 95 % | DIASTOLIC BLOOD PRESSURE: 54 MMHG | RESPIRATION RATE: 16 BRPM | HEIGHT: 65 IN | TEMPERATURE: 97.9 F

## 2018-01-29 PROCEDURE — 74011250637 HC RX REV CODE- 250/637: Performed by: PHYSICAL MEDICINE & REHABILITATION

## 2018-01-29 PROCEDURE — 74011250636 HC RX REV CODE- 250/636: Performed by: PHYSICAL MEDICINE & REHABILITATION

## 2018-01-29 PROCEDURE — 74011000250 HC RX REV CODE- 250

## 2018-01-29 PROCEDURE — 77030003672 HC NDL SPN HALY -A: Performed by: PHYSICAL MEDICINE & REHABILITATION

## 2018-01-29 PROCEDURE — 76010000009 HC PAIN MGT 0 TO 30 MIN PROC: Performed by: PHYSICAL MEDICINE & REHABILITATION

## 2018-01-29 PROCEDURE — 74011250636 HC RX REV CODE- 250/636

## 2018-01-29 RX ORDER — SODIUM CHLORIDE 0.9 % (FLUSH) 0.9 %
5-10 SYRINGE (ML) INJECTION AS NEEDED
Status: DISCONTINUED | OUTPATIENT
Start: 2018-01-29 | End: 2018-01-29 | Stop reason: HOSPADM

## 2018-01-29 RX ORDER — DIAZEPAM 5 MG/1
5-20 TABLET ORAL ONCE
Status: COMPLETED | OUTPATIENT
Start: 2018-01-29 | End: 2018-01-29

## 2018-01-29 RX ORDER — ROPIVACAINE HYDROCHLORIDE 2 MG/ML
INJECTION, SOLUTION EPIDURAL; INFILTRATION; PERINEURAL AS NEEDED
Status: DISCONTINUED | OUTPATIENT
Start: 2018-01-29 | End: 2018-01-29 | Stop reason: HOSPADM

## 2018-01-29 RX ADMIN — DIAZEPAM 10 MG: 5 TABLET ORAL at 08:29

## 2018-01-29 NOTE — H&P (VIEW-ONLY)
[address] for [facility] Management  Interventional Pain Management Consultation History & Physical    PATIENT NAME:  Irma Claros OF BIRTH:   1965    DATE OF SERVICE:   1/10/2018      CHIEF COMPLAINT:  LOW BACK PAIN      REASON FOR VISIT:   Moe Gong presents to the pain clinic today for initial evaluation and to consider interventional pain management options as indicated for the type and location of the pain the patient is presenting with. HISTORY OF PRESENT ILLNESS:      This is an initial evaluation and consideration for interventional procedures regarding this patient. She is referred to us by Dr. Melinda Butler for consideration for sacroiliac radiofrequency neurotomy procedures as indicated per      At today's evaluation, patient endorses chronic low back pain many years duration. She states that she has actually had 3 previous lumbar spine surgical procedures. She is currently noted to have lumbar fusion bilaterally L5 -S1 levels. She endorses low back pain and bilateral posterior buttocks pain. She endorses this pain as aching throbbing burning sharp shooting pain. Pain travels throughout the low back and buttocks areas. She denies radiating leg pain or radicular symptoms. Her pain symptoms are increased with prolonged sitting standing or walking. Pain symptoms are also increased with twisting and turning as well as extension of her lumbar spine. She takes medications including opioid and non-opioid regimens, that are not helping. She is tried several courses of physical therapy in the past that have not helped either. Her pain is becoming quite refractory. She has had previous steroid injections versus other interventional pain injections in the past, these initially helped her but they are no longer helping per patient endorsement.       By review of available medical records, progress note dated December 19, 2017 by Dr. Melinda Butler is reviewed. Lumbar postlaminectomy syndrome, lumbar facet arthropathy, bilateral sacroiliac joint dysfunction, bilateral sacroiliac arthropathy. She has not been able to take Neurontin or Topamax. She has been prescribed Lyrica Relafen. Patient is also apparently being considered for spinal cord stimulator per Dr. Devon Lagunas notes. Patient has more recently tried bilateral sacroiliac injections November 29, 2017. She had bilateral L4-5 and L5-S1 facet injections October 4, 2017. Minimal relief with the facet injections. She did get relief with the SI joint injections. Pain is increasing over time. Pain is becoming more unbearable for her, interfering with the quality of her life. She is tried exercising on a treadmill however pain interferes to this. Plain x-rays lumbar spine from April 12, 2017 demonstrate dextroscoliosis, significant bone hypertrophy L5-S1 bilaterally. Anterior fusion L5-S1. Disc displacement L5-S1. Facet arthropathy L4-5. Degenerative disc disease L1-L3 levels. Lumbar MRI November 7, 2012 is reviewed. Disc spacers L4-5 and L5-S1 anchoring screw L5-S1. No significant canal or neuroforaminal stenosis is noted. Facet inflammation L5-S1. Patient states that her low back pain is gotten worse especially since her last operation in 2009. My last lumbar imaging is November 7, 2012. ASSESSMENT/OPTIONS: as follows. We discussed options. Patient presents with signs and symptoms, as well as exam and imaging evidence suggestive of long-standing lumbar postlaminectomy syndrome. She also likely has symptomatology from lumbar degenerative disc disease, lumbar spondylotic changes. She is further symptomatic for bilateral sacroiliac arthropathy and sacroiliac joint dysfunction. I have explained to her risk and benefits, indications contraindications and side effects of sacroiliac radiofrequency neurotomy procedures.   She is amenable to proceed with this, she understands this procedure. In this regard, I am in agreement with Dr. Dinora Martino and that I believe the patient has symptomatology suggestive of sacroiliac joint dysfunction. We have also described in great detail today possible spinal cord stimulator trial and placement. I have used skeleton spine model as well as  spinal cord stimulator device to describe this procedure in great detail today. I described the risk and benefits, indications contraindications and side effects the procedure. Patient is amenable to proceed with this if sacroiliac radiofrequency procedures did not help her. I will obtain an updated lumbar MRI possible anticipation of this procedure. I will have to send a consult to our pain psychologist Dr. Bonita De León if we are going to proceed with spinal cord stimulator trial.  For now, we will proceed with sacroiliac radiofrequency neurotomy procedures. I have discussed the risks and benefits, indications, contraindications, and side effects of intended procedure with the patient. I have used skeleton spine model to describe and discuss the procedure with the patient. I have answered all questions relating to the procedure. Patient understands the nature of the procedure and wishes to proceed. Patient has no further questions. MRI Results (most recent):    Results from [place] encounter on 13   MRI SHOULDER RT WO CONT   Narrative Procedure: MRI of the right shoulder without contrast    CPT code: 97764    Indications: Pain and decreased range of motion    Comparisons: None    Procedure:    MRI of the shoulder was performed without intravenous or intra-articular  contrast. Sequences included: Localizer, axial PD, axial T2 with fat  saturation, coronal T1, coronal T2 with fat saturation, coronal inversion  recovery, sagittal T1, and sagittal T2 with fat saturation. Findings:      1. No os acromiale.  Moderate amount of marrow edema within the  acromioclavicular joint, suggesting active inflammation. No significant fluid  within the joint, but edema in the adjacent soft tissues. No fracture. Mild  marrow edema along the anterior humerus near the bicipital groove, likely  degenerative. 2.  The glenohumeral cartilaginous surfaces are intact. 3.  Trace glenohumeral joint fluid. 4.  Mild edema in the subacromial, subdeltoid bursa. 5.  The teres minor muscle is normal. The subscapularis tendon is intact. The  infraspinatus tendon looks normal. There is thickening and signal abnormality  within the subscapularis tendon consistent with moderate tendinosis. Small  amount of fluid tracks along a portion of the central myotendinous junction  suggesting small interstitial tear. No muscle atrophy. 6.  Glenoid labrum is intact throughout its course within limitation of no  intra-articular fluid. 7. The biceps tendon is intact. Impression IMPRESSION:      Moderate amount of marrow edema within the acromioclavicular joint, suggesting  active inflammation. No significant fluid within the joint, but edema in the  adjacent soft tissues. Findings are most likely due to overuse/developing  osteoarthrosis, but correlate clinically to exclude other processes such as  inflammatory arthropathy, infection, or hyperparathyroidism. Edema in the  adjacent subacromial, subdeltoid bursa is most likely reactive. Thickening and signal abnormality within the subscapularis tendon consistent  with moderate tendinosis. Small amount of fluid tracks along a portion of the  central myotendinous junction suggesting small interstitial tear. Mild marrow edema along the anterior humerus near the bicipital groove, likely  degenerative. Trace glenohumeral joint fluid. PAST MEDICAL HISTORY:   The patient  has a past medical history of Arthritis; Depressed (10/1/2015); Depression; Hypothyroidism;  Incontinence; Myofascial muscle pain (10/1/2015); Narcolepsy; Psychiatric disorder; Sleep apnea; and Unspecified sleep apnea. PAST SURGICAL HISTORY:   The patient  has a past surgical history that includes hx cholecystectomy; hx hysterectomy; hx mohs procedure (2014); hx bladder suspension; hx lumbar fusion (2000); hx lumbar fusion (2010); hx orthopaedic; and hx shoulder arthroscopy (Right). CURRENT MEDICATIONS:   The patient has a current medication list which includes the following prescription(s): gabapentin, pregabalin, nabumetone, mirabegron er, liothyronine, cyanocobalamin, levothyroxine, alprazolam, and duloxetine. ALLERGIES:     Allergies   Allergen Reactions    Morphine Itching    Naproxen Itching     Swelling, hives, difficulty breathing       FAMILY HISTORY:   The patient family history includes Cancer in her mother; Diabetes in her father; Heart Disease in her father; Kidney Disease in her father; No Known Problems in her brother. SOCIAL HISTORY:   The patient  reports that she quit smoking about 4 months ago. She smoked 0.50 packs per day. She has never used smokeless tobacco. The patient  reports that she drinks alcohol. She also  reports that she does not use illicit drugs. REVIEW OF SYSTEMS:    The patient denies fever, chills, weight loss (Constitutional), rash, itching (Skin), tinnitus, congestion (HENT), blurred vision, photophobia (Eyes), palpitations, orthopnea (Cardiovascular), hemoptysis, wheezing (Respiratory), nausea, vomiting, diarrhea (Gastrointestinal), dysuria, hematuria, urgency (Genitourinary), bowel or bladder incontinence, loss of consciousness (Neurologic), suicidal or homicidal ideation or hallucinations (Psychiatric). Denies swelling, axillary or groin masses (Lymphatic).            PHYSICAL EXAM:  VS:   Visit Vitals    /78 (BP 1 Location: Left arm, BP Patient Position: Sitting)    Pulse 80    Temp 98.4 °F (36.9 °C) (Oral)    Resp 14    Ht 5' 5\" (1.651 m)    Wt 92.1 kg (203 lb)    BMI 33.78 kg/m2     General: Well-developed and well-nourished. Body habitus consistent with recorded height and weight and the calculated BMI. Apparent distress due to low back and bilateral buttocks pain. Head: Normocephalic, atraumatic. Skin: Inspection of the skin reveals no rashes, lesions or infection. CV: Regular rate. No murmurs or rubs noted. No peripheral edema noted. Pulm: Respirations are even and unlabored. Extr: No clubbing, cyanosis, or edema noted. Musculoskeletal:  1. Cervical spine  Full ROM. No paraspinous tenderness at any level. There is no scoliosis, asymmetry, or musculoskeletal defect. 2. Thoracic spine  Full ROM. No paraspinous tenderness at any level. There is no scoliosis, asymmetry, or musculoskeletal defect. 3. Lumbar spine decreased range of motion all axes . Paraspinous tenderness throughout the lumbar and sacral spine. SI joints are tender bilaterally; positive Byron's, positive Gaenslen's, positive sacral distraction, positive sacroiliac compression test.  There is no scoliosis, asymmetry, or musculoskeletal defect. 4. Right upper extremity  Full ROM. 5/5 muscle strength in all muscle groups. No pain or tenderness in shoulder, elbow, wrist, or hand. 5. Left upper extremity  Full ROM. 5/5 muscle strength in all muscle groups. No pain or tenderness in shoulder, elbow, wrist, or hand. 6. Right lower extremity  Full ROM. 5/5 muscle strength in all muscle groups. No pain, tenderness, or swelling in the hip, knee, ankle or foot. 7. Left lower extremity  Full ROM. 5/5 muscle strength in all muscle groups. No pain, tenderness, or swelling in the hip, knee, ankle or foot. Neurological:  1. Mental Status - Alert, awake and oriented. Speech is clear and appropriate. 2. Cranial Nerves - Extraocular muscles intact bilaterally. Cranial nerves II-XII grossly intact bilaterally. 3. Gait - antalgic   4. Reflexes - 2+ and symmetric throughout.   5. Sensation - Intact to light touch and pin prick. 6. Provocative Tests - Straight leg raise negative bilaterally. Psychological:  1. Mood and affect  Appropriate. 2. Speech  Appropriate. 3. Though content  Appropriate. 4. Judgment  Appropriate. ASSESSMENT:      ICD-10-CM ICD-9-CM    1. Sacroiliitis (HCC) M46.1 720.2 MRI LUMB SPINE W WO CONT   2. Spondylosis of lumbosacral region without myelopathy or radiculopathy M47.817 721.3 MRI LUMB SPINE W WO CONT   3. Chronic pain syndrome G89.4 338.4 MRI LUMB SPINE W WO CONT   4. Lumbar postlaminectomy syndrome M96.1 722.83 MRI LUMB SPINE W WO CONT   5. Lumbar degenerative disc disease M51.36 722. [address] CONT   6. Lumbar facet arthropathy M12.88 721.3 MRI LUMB SPINE W WO CONT   7. Lumbar spondylosis M47.816 721.3 MRI LUMB SPINE W WO CONT           PLAN:    1.    I have thoroughly discussed the risks and benefits, indications, contraindications, and side effects of any and procedures that were mentioned at today's patient visit. I have used a skeleton model to explain all procedures, as well as to provide added emphasis regarding procedures and as well for patient education purposes. I have answered all questions in great detail, and I have obtained verbal confirmation for all procedures planned with the patient. 3.    I have reviewed in great detail today the patient's MRI and other imaging studies with the patient. I have explained to the patient their condition using both actual recent and relevant images insofar as I am able to obtain actual images. I have used a skeleton model for added emphasis as well as patient education. 4.    I have advised patient to have a primary care provider continue to care for their health maintenance and general medical conditions. 5,    I have placed appropriate referrals to specialty care providers as I have deemed necessary through today's clinical consultation with the patient.   5.    I have explained to the patient that if any significant side effects, issues, problems, concerns, or perceived complications may have arisen at around the time of the patient's procedures, they should either call the pain management clinic or go to the emergency room immediately for medical provider evaluation. 6.   I have encouraged all patients to call the pain management clinic with any questions or concerns that they may have pertaining to their procedures. DISPOSITION:   The patients condition and plan were discussed at length and all questions were answered. The patient agrees with the plan. A total of 45 minutes was spent with the patient of which over half of the time was spent counseling the patient. Deepak Madden MD 1/10/2018 1:25 PM    Note: Although these clinic notes were documented by the provider at the time of the exam, they have not been proofed and are subject to transcription variance.

## 2018-01-29 NOTE — DISCHARGE INSTRUCTIONS
[facility]ENTER for Pain Management      Post Procedures Instructions    *Resume Diet and Activity as tolerated. Rest for the remainder of the day. *You may fell worse before you feel better as the numbing medications wear off before the steroids take effect if used for your procedures. *Do not use affected extremity until numbness or loss of sensation has completely resolved without assistance. *DO NOT DRIVE, operate machinery/heavey equipment for 24 hours. *DO NOT DRINK ALCOHOL for 24 hours as it may interact with the sedation if you received it and also thins your blood and may cause you to bleed. *WAIT 24 hours before starting back ANY Blood thinning medications:   (Heparin, Coumadin, Warfarin, Lovenox, Plavix, Aggrenox)    *Resume Pre-Procedure Medications as prescribed except Blood Thinners unless directed by your Physician or Cardiologist.     *Avoid Hot tubs and Heating pad for 24 hours to prevent dissipation of medications, you may shower to remove bandages and remaining prep residue on the skin. * If you develop a Headache, drink plenty of fluids including beverages with caffeine (Coffee, Mt. Dew etc.) and rest.  If the headache persists longer than 24 hoursor intensifies - Please call [facility] ([facility]) [phone]    * If you are DIABETIC, check your blood sugar three times a day for the next three days, the steroids will increase your blood sugar. If your blood sugar is greater than 400 have someone drive you to the nearest [address]. * If you experience any of the following problems, call the Center for Pain Management [phone] between 8:00 am - 4:30pm or After Hours [phone].     Shortness of breath    Fever of 101 F or higher    Nausea / Vomiting (not normal to you)    Increasing stiffness in the neck    Weakness or numbness in the arms or legs that is not resolving    Prolonged and increasing pain > than 4 days    ANYTHING OUT of the ORDINARY TO YOU    If YOU are experiencing a severe reaction / complication that you have never had before post procedure, call 911 or go to the nearest emergency room! All patients must have a  for transportation [place] regardless if you do or do not receive sedation. DISCHARGE SUMMARY from Nurse      PATIENT INSTRUCTIONS:    After Oral  or intravenous sedation, for 24 hours or while taking prescription Narcotics:  · Limit your activities  · Do not drive and operate hazardous machinery  · Do not make important personal or business decisions  · Do  not drink alcoholic beverages  · If you have not urinated within 8 hours after discharge, please contact your surgeon on call. Report the following to your surgeon:  · Excessive pain, swelling, redness or odor of or around the surgical area  · Temperature over 101  · Nausea and vomiting lasting longer than 4 hours or if unable to take medications  · Any signs of decreased circulation or nerve impairment to extremity: change in color, persistent  numbness, tingling, coldness or increase pain  · Any questions        What to do at Home:  Recommended activity: Activity as tolerated, NO DRIVING FOR 24 Hours post injection          *  Please give a list of your current medications to your Primary Care Provider. *  Please update this list whenever your medications are discontinued, doses are      changed, or new medications (including over-the-counter products) are added. *  Please carry medication information at all times in case of emergency situations. These are general instructions for a healthy lifestyle:    No smoking/ No tobacco products/ Avoid exposure to second hand smoke    Surgeon General's Warning:  Quitting smoking now greatly reduces serious risk to your health.     Obesity, smoking, and sedentary lifestyle greatly increases your risk for illness    A healthy diet, regular physical exercise & weight monitoring are important for maintaining a healthy lifestyle    You may be retaining fluid if you have a history of heart failure or if you experience any of the following symptoms:  Weight gain of 3 pounds or more overnight or 5 pounds in a week, increased swelling in our hands or feet or shortness of breath while lying flat in bed. Please call your doctor as soon as you notice any of these symptoms; do not wait until your next office visit. Recognize signs and symptoms of STROKE:    F-face looks uneven    A-arms unable to move or move unevenly    S-speech slurred or non-existent    T-time-call 911 as soon as signs and symptoms begin-DO NOT go       Back to bed or wait to see if you get better-TIME IS BRAIN.

## 2018-01-29 NOTE — PROCEDURES
[facility] [place] [facility]    LUMBAR DORSAL RAMUS AND LATERAL BRANCH BLOCKS             PROCEDURE REPORT      PATIENT:  Praveen Corea OF BIRTH:  1965  DATE OF SERVICE:  1/29/2018  [facility]    PRE-PROCEDURE DIAGNOSIS:  See Above    POST-PROCEDURE DIAGNOSIS:  See Above                PROCEDURE:  1. Bilateral diagnostic lumbar facet injection (via the L5 medial branch nerve block) (40435, 50 )        2. Bilateral S1, S2 and S3 dorsal rami lateral branch nerve              blocks (64450 x3)        3. Fluoroscopic needle guidance, spinal (for the lateral branch blocks) (94584)            ANESTHESIA:  Local with oral sedation. See Medication Administration Record for specific medications and dosage. COMPLICATIONS: None. PHYSICIAN:  Javier Neely MD    PRE-PROCEDURE NOTE:  Pre-procedural assessment of the patient was performed including a limited history and physical examination. The details of the procedure were discussed with the patient, including the risks, benefits and alternative options and an informed consent was obtained. The patients NPO status, if necessary for the specific procedure and/or administration of moderate intravenous sedation, if utilized, and availability of a responsible adult to escort the patient following the procedure were confirmed. PROCEDURE NOTE:  The patient was brought to the procedure suite and positioned on the fluoroscopy table in the prone position. Physiologic monitors were applied and supplemental oxygen was administered via nasal cannula. The skin was prepped in the standard surgical fashion and sterile drapes were applied over the procedure site. Please refer to the Flowsheet for documentation of the patients vital signs and the Medication Administration Report for any oral sedation administered prior to or during the procedure.        1% Lidocaine was utilized for local anesthesia. Under AP fluoroscopic guidance a 22-gauge, 3-1/2 inch short bevel spinal needle was advanced to the superior margin of the sacral ala to block the respective L5 medial branch nerve(s). Following this, a 22-gauge, 3-1/2 inch short bevel spinal needle was advanced to the lateral aspect of the S1 neural foramen and repeated in the same fashion at the S2 and S3 neural foramina. After all needles were placed, 1 mL of 0.2% ropivacaine was injected at each location after the negative aspiration of blood, air or CSF. The needles were removed and the stilets were replaced. The procedure was performed on the contralateral side in the same fashion and at the same levels using the same volume of local anesthetic following negative aspiration of blood, air or CSF. The needles were removed intact. The area was thoroughly cleaned and sterile bandages applied as necessary. The patient tolerated the procedure well and vital signs remained stable throughout the procedure. The patient was assessed immediately following the procedure and was noted to have greater than 50% reduction in pain (a reduction from 8/10 to 2/10 in severity of lumbar pain). Based on these results, the patient was considered an appropriate candidate for radiofrequency ablation of the above-mentioned medial and lateral branch nerves and will be scheduled for that procedure. POST-PROCEDURE COURSE:   The patient was escorted from the procedure suite in satisfactory condition and recovered per facility protocol based on the type of procedure performed and/or the sedation utilized. The patient did not experience any adverse events and remained hemodynamically stable during the post-procedure period. DISCHARGE NOTE:  Upon discharge, the patient was able to tolerate fluids and was in no acute distress. The patient was oriented to person, place and time and vital signs were stable.  Appropriate post-procedure instructions were provided and explained to the patient in detail and all questions were answered.     Zita Morgan MD 1/29/2018 9:56 AM

## 2018-01-29 NOTE — INTERVAL H&P NOTE
H&P Update:  Jennifer Lawrence was seen and examined. History and physical has been reviewed. The patient has been examined.  There have been no significant clinical changes since the completion of the originally dated History and Physical.    Signed By: Stacy Coon MD     January 29, 2018 9:15 AM

## 2018-01-30 ENCOUNTER — TELEPHONE (OUTPATIENT)
Dept: PAIN MANAGEMENT | Age: 53
End: 2018-01-30

## 2018-01-30 NOTE — TELEPHONE ENCOUNTER
Ms. Sully Childs was contacted for follow-up status post Bilateral diagnostic lumbar facet injection  and Bilateral S1, S2 and S3 dorsal rami lateral branch nerver  blocks on January 29, 2018.  She reports:    Pre-procedure numerical pain score: 7/10  Post-procedure numerical pain score immediately after: 1/10  Duration of relief post-procedure (if applicable): 4 hrs  Improvement in functional activities (if applicable): Yes  Percentage of overall improvement: 80%    COMMENTS:

## 2018-02-02 DIAGNOSIS — M62.830 MUSCLE SPASM OF BACK: ICD-10-CM

## 2018-02-02 RX ORDER — CYCLOBENZAPRINE HCL 10 MG
TABLET ORAL
Qty: 30 TAB | Refills: 0 | Status: SHIPPED | OUTPATIENT
Start: 2018-02-02 | End: 2018-03-08 | Stop reason: SDUPTHER

## 2018-02-06 ENCOUNTER — HOSPITAL ENCOUNTER (OUTPATIENT)
Age: 53
Discharge: HOME | End: 2018-02-06
Attending: PHYSICAL MEDICINE & REHABILITATION
Payer: MEDICARE

## 2018-02-06 DIAGNOSIS — G89.4 CHRONIC PAIN SYNDROME: ICD-10-CM

## 2018-02-06 DIAGNOSIS — M47.817 SPONDYLOSIS OF LUMBOSACRAL REGION WITHOUT MYELOPATHY OR RADICULOPATHY: ICD-10-CM

## 2018-02-06 DIAGNOSIS — M47.816 LUMBAR FACET ARTHROPATHY: ICD-10-CM

## 2018-02-06 DIAGNOSIS — M47.816 LUMBAR SPONDYLOSIS: ICD-10-CM

## 2018-02-06 DIAGNOSIS — M51.36 LUMBAR DEGENERATIVE DISC DISEASE: ICD-10-CM

## 2018-02-06 DIAGNOSIS — M96.1 LUMBAR POSTLAMINECTOMY SYNDROME: ICD-10-CM

## 2018-02-06 DIAGNOSIS — M46.1 SACROILIITIS (HCC): ICD-10-CM

## 2018-02-06 PROCEDURE — 72158 MRI LUMBAR SPINE W/O & W/DYE: CPT

## 2018-02-06 PROCEDURE — A9585 GADOBUTROL INJECTION: HCPCS | Performed by: PHYSICAL MEDICINE & REHABILITATION

## 2018-02-06 PROCEDURE — 74011250636 HC RX REV CODE- 250/636: Performed by: PHYSICAL MEDICINE & REHABILITATION

## 2018-02-06 RX ADMIN — GADOBUTROL 9.5 ML: 604.72 INJECTION INTRAVENOUS at 14:00

## 2018-03-08 DIAGNOSIS — M62.830 MUSCLE SPASM OF BACK: ICD-10-CM

## 2018-03-08 RX ORDER — CYCLOBENZAPRINE HCL 10 MG
TABLET ORAL
Qty: 30 TAB | Refills: 0 | Status: SHIPPED | OUTPATIENT
Start: 2018-03-08 | End: 2019-09-13 | Stop reason: SDUPTHER

## 2018-09-20 ENCOUNTER — OFFICE VISIT (OUTPATIENT)
Dept: ORTHOPEDIC SURGERY | Age: 53
End: 2018-09-20

## 2018-09-20 VITALS
HEIGHT: 65 IN | DIASTOLIC BLOOD PRESSURE: 96 MMHG | HEART RATE: 77 BPM | SYSTOLIC BLOOD PRESSURE: 141 MMHG | OXYGEN SATURATION: 98 % | BODY MASS INDEX: 36.32 KG/M2 | WEIGHT: 218 LBS

## 2018-09-20 DIAGNOSIS — M47.816 LUMBAR FACET ARTHROPATHY: ICD-10-CM

## 2018-09-20 DIAGNOSIS — Z98.1 S/P LUMBAR FUSION: ICD-10-CM

## 2018-09-20 DIAGNOSIS — G89.29 OTHER CHRONIC PAIN: Primary | ICD-10-CM

## 2018-09-20 DIAGNOSIS — G89.29 OTHER CHRONIC PAIN: ICD-10-CM

## 2018-09-20 DIAGNOSIS — M62.838 MUSCLE SPASM: ICD-10-CM

## 2018-09-20 DIAGNOSIS — M53.3 PAIN OF BOTH SACROILIAC JOINTS: ICD-10-CM

## 2018-09-20 PROBLEM — E66.01 SEVERE OBESITY (BMI 35.0-39.9): Status: ACTIVE | Noted: 2018-09-20

## 2018-09-20 RX ORDER — LEVOTHYROXINE SODIUM 137 UG/1
CAPSULE ORAL
COMMUNITY
End: 2018-10-29 | Stop reason: DRUGHIGH

## 2018-09-20 RX ORDER — NABUMETONE 750 MG/1
750 TABLET, FILM COATED ORAL 2 TIMES DAILY WITH MEALS
Qty: 180 TAB | Refills: 1 | Status: SHIPPED | OUTPATIENT
Start: 2018-09-20 | End: 2018-10-29

## 2018-09-20 RX ORDER — NABUMETONE 750 MG/1
750 TABLET, FILM COATED ORAL 2 TIMES DAILY WITH MEALS
Qty: 60 TAB | Refills: 3 | Status: SHIPPED | OUTPATIENT
Start: 2018-09-20 | End: 2018-09-20 | Stop reason: SDUPTHER

## 2018-09-20 NOTE — TELEPHONE ENCOUNTER
Insurance requires a minimum fill for 90 days. If appropriate, please sign pended medication order. If not, please notify me. Last Visit: 09/20/2018 with MD Сергей Phan    Next Appointment: noted to f/u prn     Requested Prescriptions     Pending Prescriptions Disp Refills    nabumetone (RELAFEN) 750 mg tablet 180 Tab 1     Sig: Take 1 Tab by mouth two (2) times daily (with meals).

## 2018-09-20 NOTE — MR AVS SNAPSHOT
[address] [facility] 
 
 
 [address] [facility] 
[phone] Patient: Marley Cantu MRN: [id number] Visit Information Date & Time Provider Department Dept. Phone Encounter #  
 9/20/2018  8:30 AM Dillon Malik, [facility] [place] [phone] Follow-up Instructions Return if symptoms worsen or fail to improve. 11/8/2018 11:45 AM  
Any with Aron Irene MD  
[facility]) Appt Note: EP- Recurrent UTI, frequency, stress incontinence, OAB, 8 months follow up  
 [address] [facility] [address] [facility] [address] [facility]  
  
   
 [address] [facility] [address] [facility] 100 200 [facility] Upcoming Health Maintenance Date Due Hepatitis C Screening 1965 DTaP/Tdap/Td series (1 - Tdap) 4/4/1985 PAP AKA CERVICAL CYTOLOGY 4/4/1985 BREAST CANCER SCRN MAMMOGRAM 4/4/2014 FOBT Q 1 YEAR AGE 50-75 4/4/2014 MEDICARE YEARLY EXAM 3/14/2018 Influenza Age 5 to Adult 8/1/2018 Allergies as of 9/20/2018  Review Complete On: 9/20/2018 By: Dillon Malik MD  
  
 Severity Noted Reaction Type Reactions Morphine  01/29/2013    Itching Naproxen  01/29/2013    Itching Swelling, hives, difficulty breathing Current Immunizations  Never Reviewed No immunizations on file. Not reviewed this visit You Were Diagnosed With   
  
 Codes Comments Other chronic pain    -  Primary ICD-10-CM: G89.29 ICD-9-CM: 338.29 Pain of both sacroiliac joints     ICD-10-CM: M53.3 ICD-9-CM: 724.6 S/P lumbar fusion     ICD-10-CM: Z98.1 ICD-9-CM: V45.4 Lumbar facet arthropathy (HCC)     ICD-10-CM: M46.96 
ICD-9-CM: 721.3 Muscle spasm     ICD-10-CM: N56.333 ICD-9-CM: 728.85 Vitals BP Pulse Height(growth percentile) Weight(growth percentile) SpO2 BMI (!) 141/96 77 5' 5\" (1.651 m) 218 lb (98.9 kg) 98% 36.28 kg/m2 OB Status Smoking Status Hysterectomy Former Smoker BMI and BSA Data Body Mass Index Body Surface Area  
 36.28 kg/m 2 2.13 m 2 Preferred Pharmacy Pharmacy Name Phone [facility], [address] [phone] Your Updated Medication List  
  
   
This list is accurate as of 9/20/18  9:04 AM.  Always use your most recent med list.  
  
  
  
  
 ALPRAZolam 0.5 mg tablet Commonly known as:  Esther Keisha TK 1 T PO ONCE A DAY PRN FOR ANXIETY. cyanocobalamin 1,000 mcg/mL injection Commonly known as:  VITAMIN B12 INJECT 1ML SUBCUTANEOUSLY EVERY 1 WEEK  
  
 cyclobenzaprine 10 mg tablet Commonly known as:  FLEXERIL  
TAKE 1 TABLET BY MOUTH DAILY AS NEEDED FOR MUSCLE SPASMS FLUoxetine 40 mg capsule Commonly known as:  PROzac  
40 mg.  
  
 gabapentin 100 mg capsule Commonly known as:  NEURONTIN Take  by mouth three (3) times daily. * levothyroxine 175 mcg tablet Commonly known as:  SYNTHROID Take 175 mcg by mouth Daily (before breakfast). * Levothyroxine 137 mcg Cap Take  by mouth. * levothyroxine 150 mcg tablet Commonly known as:  SYNTHROID TK 1 T PO ONCE A DAY  
  
 * liothyronine 25 mcg tablet Commonly known as:  CYTOMEL  
TK 1 T PO  QAM  
  
 * liothyronine 5 mcg tablet Commonly known as:  CYTOMEL  
TK 1 T PO D  
  
 mirabegron ER 50 mg ER tablet Commonly known as:  MYRBETRIQ Take 1 Tab by mouth daily. nabumetone 750 mg tablet Commonly known as:  RELAFEN Take 1 Tab by mouth two (2) times daily (with meals). oxyCODONE-acetaminophen 5-325 mg per tablet Commonly known as:  PERCOCET TK 1 TO 2 TS PO EVERY 4 HOURS PRN FOR PAIN. DO NOT EXCEED 4000MG OF ACETAMINOPHEN PER DAY * Notice:   This list has 5 medication(s) that are the same as other medications prescribed for you. Read the directions carefully, and ask your doctor or other care provider to review them with you. Prescriptions Sent to Pharmacy Refills  
 nabumetone (RELAFEN) 750 mg tablet 3 Sig: Take 1 Tab by mouth two (2) times daily (with meals). Class: Normal  
 Pharmacy: [address] [facility], [address] #: [phone] Route: Oral  
  
We Performed the Following REFERRAL TO PAIN MANAGEMENT [[id number] Custom] Comments:  
 Eval RFA right SI joint Dr. Jen Davis Follow-up Instructions Return if symptoms worsen or fail to improve. Referral Information Referral ID Referred By Referred To  
  
 [id number] Yadi Fortune Not Available Visits Status Start Date End Date 1 New Request 9/20/18 9/20/19 If your referral has a status of pending review or denied, additional information will be sent to support the outcome of this decision. Patient Instructions Low Back Arthritis: Exercises Your Care Instructions Here are some examples of typical rehabilitation exercises for your condition. Start each exercise slowly. Ease off the exercise if you start to have pain. Your doctor or physical therapist will tell you when you can start these exercises and which ones will work best for you. When you are not being active, find a comfortable position for rest. Some people are comfortable on the floor or a medium-firm bed with a small pillow under their head and another under their knees. Some people prefer to lie on their side with a pillow between their knees. Don't stay in one position for too long. Take short walks (10 to 20 minutes) every 2 to 3 hours. Avoid slopes, hills, and stairs until you feel better. Walk only distances you can manage without pain, especially leg pain. How to do the exercises Pelvic tilt 1. Lie on your back with your knees bent. 2. \"Brace\" your stomach-tighten your muscles by pulling in and imagining your belly button moving toward your spine. 3. Press your lower back into the floor. You should feel your hips and pelvis rock back. 4. Hold for 6 seconds while breathing smoothly. 5. Relax and allow your pelvis and hips to rock forward. 6. Repeat 8 to 12 times. Back stretches 1. Get down on your hands and knees on the floor. 2. Relax your head and allow it to droop. Round your back up toward the ceiling until you feel a nice stretch in your upper, middle, and lower back. Hold this stretch for as long as it feels comfortable, or about 15 to 30 seconds. 3. Return to the starting position with a flat back while you are on your hands and knees. 4. Let your back sway by pressing your stomach toward the floor. Lift your buttocks toward the ceiling. 5. Hold this position for 15 to 30 seconds. 6. Repeat 2 to 4 times. Follow-up care is a key part of your treatment and safety. Be sure to make and go to all appointments, and call your doctor if you are having problems. It's also a good idea to know your test results and keep a list of the medicines you take. Where can you learn more? Go to http://garry-neyda.info/. Enter W528 in the search box to learn more about \"Low Back Arthritis: Exercises. \" Current as of: November 29, 2017 Content Version: 11.7 © 7096-3022 [employer]. Care instructions adapted under license by [employer] (which disclaims liability or warranty for this information). If you have questions about a medical condition or this instruction, always ask your healthcare professional. [place] [address] any warranty or liability for your use of this information. Sacroiliac Pain: Exercises Your Care Instructions Here are some examples of typical rehabilitation exercises for your condition. Start each exercise slowly.  Ease off the exercise if you start to have pain. Your doctor or physical therapist will tell you when you can start these exercises and which ones will work best for you. How to do the exercises Knee-to-chest stretch 7. Do not do the knee-to-chest exercise if it causes or increases back or leg pain. 8. Lie on your back with your knees bent and your feet flat on the floor. You can put a small pillow under your head and neck if it is more comfortable. 9. Grasp your hands under one knee and bring the knee to your chest, keeping the other foot flat on the floor. 10. Keep your lower back pressed to the floor. Hold for at least 15 to 30 seconds. 11. Relax and lower the knee to the starting position. Repeat with the other leg. 12. Repeat 2 to 4 times with each leg. 13. To get more stretch, keep your other leg flat on the floor while pulling your knee to your chest. 
Bridging 7. Lie on your back with both knees bent. Your knees should be bent about 90 degrees. 8. Tighten your belly muscles by pulling in your belly button toward your spine. Then push your feet into the floor, squeeze your buttocks, and lift your hips off the floor until your shoulders, hips, and knees are all in a straight line. 9. Hold for about 6 seconds as you continue to breathe normally, and then slowly lower your hips back down to the floor and rest for up to 10 seconds. 10. Repeat 8 to 12 times. Hip extension 1. Get down on your hands and knees on the floor. 2. Keeping your back and neck straight, lift one leg straight out behind you. When you lift your leg, keep your hips level. Don't let your back twist, and don't let your hip drop toward the floor. 3. Hold for 6 seconds. Repeat 8 to 12 times with each leg. 4. If you feel steady and strong when you do this exercise, you can make it more difficult. To do this, when you lift your leg, also lift the opposite arm straight out in front of you.  For example, lift the left leg and the right arm at the same time. (This is sometimes called the \"bird dog exercise. \") Hold for 6 seconds, and repeat 8 to 12 times on each side. Clamshell 1. Lie on your side with a pillow under your head. Keep your feet and knees together and your knees bent. 2. Raise your top knee, but keep your feet together. Do not let your hips roll back. Your legs should open up like a clamshell. 3. Hold for 6 seconds. 4. Slowly lower your knee back down. Rest for 10 seconds. 5. Repeat 8 to 12 times. 6. Switch to your other side and repeat steps 1 through 5. Hamstring wall stretch 1. Lie on your back in a doorway, with one leg through the open door. 2. Slide your affected leg up the wall to straighten your knee. You should feel a gentle stretch down the back of your leg. 1. Do not arch your back. 2. Do not bend either knee. 3. Keep one heel touching the floor and the other heel touching the wall. Do not point your toes. 3. Hold the stretch for at least 1 minute to begin. Then try to lengthen the time you hold the stretch to as long as 6 minutes. 4. Switch legs, and repeat steps 1 through 3. 
5. Repeat 2 to 4 times. 6. If you do not have a place to do this exercise in a doorway, there is another way to do it: 
7. Lie on your back, and bend one knee. 8. Loop a towel under the ball and toes of that foot, and hold the ends of the towel in your hands. 9. Straighten your knee, and slowly pull back on the towel. You should feel a gentle stretch down the back of your leg. 10. Switch legs, and repeat steps 1 through 3. 
11. Repeat 2 to 4 times. Lower abdominal strengthening 1. Lie on your back with your knees bent and your feet flat on the floor. 2. Tighten your belly muscles by pulling your belly button in toward your spine. 3. Lift one foot off the floor and bring your knee toward your chest, so that your knee is straight above your hip and your leg is bent like the letter \"L. \" 
 4. Lift the other knee up to the same position. 5. Lower one leg at a time to the starting position. 6. Keep alternating legs until you have lifted each leg 8 to 12 times. 7. Be sure to keep your belly muscles tight and your back still as you are moving your legs. Be sure to breathe normally. Piriformis stretch 1. Lie on your back with your legs straight. 2. Lift your affected leg, and bend your knee. With your opposite hand, reach across your body, and then gently pull your knee toward your opposite shoulder. 3. Hold the stretch for 15 to 30 seconds. 4. Switch legs and repeat steps 1 through 3. 
5. Repeat 2 to 4 times. Follow-up care is a key part of your treatment and safety. Be sure to make and go to all appointments, and call your doctor if you are having problems. It's also a good idea to know your test results and keep a list of the medicines you take. Where can you learn more? Go to http://garry-neyda.info/. Enter N092 in the search box to learn more about \"Sacroiliac Pain: Exercises. \" Current as of: November 29, 2017 Content Version: 11.7 © 9057-1787 [employer], [employer]. Care instructions adapted under license by [employer] (which disclaims liability or warranty for this information). If you have questions about a medical condition or this instruction, always ask your healthcare professional. [place] [address] any warranty or liability for your use of this information. Introducing [facility]! Beech Grovegonzalo Keita introduces [employer] patient portal. Now you can access parts of your medical record, email your doctor's office, and request medication refills online. 1. In your internet browser, go to [email or web address] 2. Click on the First Time User? Click Here link in the Sign In box. You will see the New Member Sign Up page. 3. Enter your [employer] Access Code exactly as it appears below.  You will not need to use this code after youve completed the sign-up process. If you do not sign up before the expiration date, you must request a new code. · [employer] Access Code: [email or web address] Expires: 12/18/2018  1:03 PM 
 
4. Enter the last four digits of your Social Security Number (xxxx) and Date of Birth (mm/dd/yyyy) as indicated and click Submit. You will be taken to the next sign-up page. 5. Create a [employer] ID. This will be your [employer] login ID and cannot be changed, so think of one that is secure and easy to remember. 6. Create a [employer] password. You can change your password at any time. 7. Enter your Password Reset Question and Answer. This can be used at a later time if you forget your password. 8. Enter your e-mail address. You will receive e-mail notification when new information is available in [address] 9. Click Sign Up. You can now view and download portions of your medical record. 10. Click the Download Summary menu link to download a portable copy of your medical information. If you have questions, please visit the Frequently Asked Questions section of the [employer] website. Remember, [employer] is NOT to be used for urgent needs. For medical emergencies, dial 911. Now available from your iPhone and Android! Please provide this summary of care documentation to your next provider. Your primary care clinician is listed as Juan Rousseau. If you have any questions after today's visit, please call [phone].

## 2018-09-20 NOTE — PROGRESS NOTES
[facility]  [address] [place], [place] [address]  Phone: [phone]  Fax: [phone]    Pt's YOB: 1965    ASSESSMENT   Diagnoses and all orders for this visit:    1. Other chronic pain  -     REFERRAL TO PAIN MANAGEMENT    2. Pain of both sacroiliac joints  -     REFERRAL TO PAIN MANAGEMENT    3. S/P lumbar fusion  -     REFERRAL TO PAIN MANAGEMENT    4. Lumbar facet arthropathy (HCC)  -     REFERRAL TO PAIN MANAGEMENT    5. Muscle spasm  -     REFERRAL TO PAIN MANAGEMENT         IMPRESSION AND PLAN:  En Fihs is a 46 y.o. female with history of chronic lumbar pain. Pt notes that she continues to experience pain in the lower back that extends into the buttocks. She has tried sacroiliac joint injections in the past and notes that over time they provided less and less relief. Pt notes stomach upset when taking BC powders. 1) Pt was given information on lumbar arthritis and sacroiliac exercises. 2) Discussed treatment options with the Pt, including steroid injections and a sacroiliac RFA. 3) She received a refill of Relafen 750 mg 1 tab BID prn pain. 4) Pt was referred to Pain Management for a bilateral sacroiliac RFA. 5) Ms. Sparkle Hunt has a reminder for a \"due or due soon\" health maintenance. I have asked that she contact her primary care provider, Amber Salgado MD, for follow-up on this health maintenance. 6)  demonstrated consistency with prescribing. 7) Pt will follow-up as needed. HISTORY OF PRESENT ILLNESS:  En Fish is a 46 y.o. female with history of chronic lumbar pain. She was last seen on 12/09/2017. Pt notes that she continues to experience pain in the lower back that extends into the buttocks. She reports that when her lower back pain is severe she also experiences swelling in the lower back  Pt notes that \"on my worst days I'm in a chair\" and requests medication at this time.  She reports the recent onset of bilateral knee pain. Pt was not interested in trying a spinal cord stimulator as recommended by Dr. Mishel Paredes. She states that she has multiple surgeries in the past and is not particularly interested in an additional surgery at this time. Pt states that she underwent trial injections for a RFA with Dr. Mishel Paredes but decided not to proceed with the procedure. She has tried sacroiliac joint injections in the past and notes that over time they provided less and less relief. Of note, she last has lumbar surgery in 2008. Pt was in pain management in the past but was not pleased with her experience at [facility] for Pain Management. She has been taking BC powders as needed but notes that it \"has eaten my stomach. \" Pt tried Relafen in the past with benefit but is no longer taking the medication. She notes that her quality of life has decreased and reports that she wanted to go with her family to [facility] but cannot due to her pain. She has difficulty with all activities and also with sleep. Pt likes to exercise in her pool. Pt is currently on Prozac and notes that she used to take Cymbalta in the past. Pt at this time desires to proceed with medication evaluation. Pain Scale: 8/10    PCP: Lenore Degroot MD     Past Medical History:   Diagnosis Date    Arthritis     Depressed 10/1/2015    Depression     Hypothyroidism     Incontinence     Myofascial muscle pain 10/1/2015    Narcolepsy     Psychiatric disorder     Sleep apnea     Unspecified sleep apnea     uses cpap machine        Social History     Social History    Marital status: LEGALLY      Spouse name: N/A    Number of children: N/A    Years of education: N/A     Occupational History    Not on file.      Social History Main Topics    Smoking status: Former Smoker     Packs/day: 0.50     Quit date: 09/2017    Smokeless tobacco: Never Used    Alcohol use Yes      Comment: weekends and few times per week    Drug use: No    Sexual activity: Yes     Birth control/ protection: Surgical      Comment: Hysterectomy     Other Topics Concern    Not on file     Social History Narrative       Current Outpatient Prescriptions   Medication Sig Dispense Refill    Levothyroxine 137 mcg cap Take  by mouth.  FLUoxetine (PROZAC) 40 mg capsule 40 mg.      levothyroxine (SYNTHROID) 150 mcg tablet TK 1 T PO ONCE A DAY  3    liothyronine (CYTOMEL) 5 mcg tablet TK 1 T PO D  4    liothyronine (CYTOMEL) 25 mcg tablet TK 1 T PO  QAM  1    nabumetone (RELAFEN) 750 mg tablet Take 1 Tab by mouth two (2) times daily (with meals). 180 Tab 1    cyclobenzaprine (FLEXERIL) 10 mg tablet TAKE 1 TABLET BY MOUTH DAILY AS NEEDED FOR MUSCLE SPASMS 30 Tab 0    oxyCODONE-acetaminophen (PERCOCET) 5-325 mg per tablet TK 1 TO 2 TS PO EVERY 4 HOURS PRN FOR PAIN. DO NOT EXCEED 4000MG OF ACETAMINOPHEN PER DAY  0    gabapentin (NEURONTIN) 100 mg capsule Take  by mouth three (3) times daily.  mirabegron ER (MYRBETRIQ) 50 mg ER tablet Take 1 Tab by mouth daily. 90 Tab 3    ALPRAZolam (XANAX) 0.5 mg tablet TK 1 T PO ONCE A DAY PRN FOR ANXIETY. 1    cyanocobalamin (VITAMIN B12) 1,000 mcg/mL injection INJECT 1ML SUBCUTANEOUSLY EVERY 1 WEEK  2    levothyroxine (SYNTHROID) 175 mcg tablet Take 175 mcg by mouth Daily (before breakfast). Allergies   Allergen Reactions    Morphine Itching    Naproxen Itching     Swelling, hives, difficulty breathing         REVIEW OF SYSTEMS    Constitutional: Negative for fever, chills, or weight change. Respiratory: Negative for cough or shortness of breath. Cardiovascular: Negative for chest pain or palpitations. Gastrointestinal: Negative for acid reflux, change in bowel habits, or constipation. Genitourinary: Negative for dysuria and flank pain. Musculoskeletal: Positive for lumbar pain. Skin: Negative for rash.    Neurological: Negative for headaches, dizziness, or numbness. Endo/Heme/Allergies: Negative for increased bruising. Psychiatric/Behavioral: Positive for difficulty with sleep. PHYSICAL EXAMINATION  Visit Vitals    BP (!) 141/96    Pulse 77    Ht 5' 5\" (1.651 m)    Wt 218 lb (98.9 kg)    SpO2 98%    BMI 36.28 kg/m2       Constitutional: Awake, alert, and in no acute distress. Neurological: 1+ symmetrical DTRs in the lower extremities. Sensation to light touch is intact. Skin: warm, dry, and intact. Musculoskeletal: Tenderness to palpation in the lower lumbar region and over the sacroiliac joints bilaterally. Decreased range of motion and pain with extension and axial loading. Improved with forward flexion. No pain with internal or external rotation of her hips. Negative straight leg raise bilaterally. Hip Flex  Quads Hamstrings Ankle DF EHL Ankle PF   Right +4/5 +4/5 +4/5 +4/5 +4/5 +4/5   Left +4/5 +4/5 +4/5 +4/5 +4/5 +4/5     IMAGING:    Lumbar spine 4V x-rays from 04/12/2017 were personally reviewed with the Pt and demonstrated:  1) Dextroscoliosis . 2) Significant bony hypertrophy at L5-S1 bilaterally. 3) Anterior fusion L5-S1. 4) Mild calcification in the aorta. 5) Disc replacement at L5-S1. 6) Significant facet arthropathy at L4-5.   7) Mild degenerative discs at L1 and L3.  8) No instability.       Knee x-rays from 04/12/2017 were personally reviewed and demonstrated:  1) Normal joint space bilaterally. 2) No significant degenerative changes in the left knee.       Cervical spine MRI from 06/04/2013 was personally reviewed and demonstrated:  Results from Hospital Encounter on 06/04/13   MRI CERV SPINE WO CONT     Narrative Sagittal and axial multisequence MR images of cervical spine were obtained. Normal alignment. No compression fracture or pathologic marrow signal. No  prevertebral soft tissue abnormalities. Craniocervical junction is normal.  Spinal cord shows normal signal intensity and morphology.     C2-C3: No disc herniation, central or foraminal stenosis. C3-C4: No disc herniation, central or foraminal stenosis. C4-C5: Diffuse posterior disc bulge with small left paracentral disc  protrusion, slightly contacting spinal cord with no cord compression or edema. No central stenosis. No foraminal stenosis. C5-C6: Mild posterior disc bulge with no central or foraminal stenosis. C6-C7: Mild posterior disc bulge with no central stenosis. Mild left foraminal  stenosis. Patent right foramen. C7-T1: Unremarkable.              Impression Impression: Minimal degenerative disc disease, most prominent at C4-C5 as  described.              Lumbar spine MRI from 11/07/2012 was personally reviewed with the Pt and demonstrated:  Results from Hospital Encounter on 11/07/12   MRI LUMB SPINE W WO CONT     Narrative Sagittal and axial multisequence MR images of lumbar spine were obtained  without and with 20 cc Magnevist gadolinium IV contrast.    No prior studies. Significant motion artifacts throughout. Disc spacer at L4-L5 and L5-S1 with  anterior anchoring screw L5-S1. There is mild anterior spondylolisthesis of L5. Posterior soft tissue artifacts in lower back. No compression fracture or  pathologic marrow signal. No abnormal enhancement. Likely small cyst in the  left kidney. Conus medullaris ends at L1 with normal morphology and signal  intensity. No abnormal enhancement. No epidural collection. L1-L2: Unremarkable. L2-L3: Minimal posterior disc bulge. No central or foraminal stenosis. Tiny  bilateral facet joint effusion. L3-L4: No disc herniation. Facet hypertrophy with mild ligamentous hypertrophy. No central stenosis. No foraminal stenosis. L4-L5: No disc material. Prominent anterior epidural fat but no central  stenosis, thecal sac compression or distortion. No suggestion of arachnoiditis. Patent neural foramina. Bony bridging along the facets, likely from previous  surgery.     L5-S1: Prominent epidural fat. No disc material. No thecal sac compression. No  significant foraminal stenosis or exiting L5 nerve root compression. Small  perineuronal cysts in the sacral segment. Inflammation seen in L5-S1 facet  joints              Impression Impression: Postoperative change as above. No central stenosis or foraminal  stenosis. No disc herniation. Facet inflammation at L5- S1.      Written by Lauren Johnson, as dictated by Jono Yoo MD.  I, Dr. Jono Yoo confirm that all documentation is accurate.

## 2018-09-20 NOTE — PATIENT INSTRUCTIONS
Low Back Arthritis: Exercises  Your Care Instructions  Here are some examples of typical rehabilitation exercises for your condition. Start each exercise slowly. Ease off the exercise if you start to have pain. Your doctor or physical therapist will tell you when you can start these exercises and which ones will work best for you. When you are not being active, find a comfortable position for rest. Some people are comfortable on the floor or a medium-firm bed with a small pillow under their head and another under their knees. Some people prefer to lie on their side with a pillow between their knees. Don't stay in one position for too long. Take short walks (10 to 20 minutes) every 2 to 3 hours. Avoid slopes, hills, and stairs until you feel better. Walk only distances you can manage without pain, especially leg pain. How to do the exercises  Pelvic tilt    1. Lie on your back with your knees bent. 2. \"Brace\" your stomach-tighten your muscles by pulling in and imagining your belly button moving toward your spine. 3. Press your lower back into the floor. You should feel your hips and pelvis rock back. 4. Hold for 6 seconds while breathing smoothly. 5. Relax and allow your pelvis and hips to rock forward. 6. Repeat 8 to 12 times. Back stretches    1. Get down on your hands and knees on the floor. 2. Relax your head and allow it to droop. Round your back up toward the ceiling until you feel a nice stretch in your upper, middle, and lower back. Hold this stretch for as long as it feels comfortable, or about 15 to 30 seconds. 3. Return to the starting position with a flat back while you are on your hands and knees. 4. Let your back sway by pressing your stomach toward the floor. Lift your buttocks toward the ceiling. 5. Hold this position for 15 to 30 seconds. 6. Repeat 2 to 4 times. Follow-up care is a key part of your treatment and safety.  Be sure to make and go to all appointments, and call your doctor if you are having problems. It's also a good idea to know your test results and keep a list of the medicines you take. Where can you learn more? Go to http://garry-neyda.info/. Enter I289 in the search box to learn more about \"Low Back Arthritis: Exercises. \"  Current as of: November 29, 2017  Content Version: 11.7  © 3991-9499 [employer]. Care instructions adapted under license by [employer] (which disclaims liability or warranty for this information). If you have questions about a medical condition or this instruction, always ask your healthcare professional. [place] [address] any warranty or liability for your use of this information. Sacroiliac Pain: Exercises  Your Care Instructions  Here are some examples of typical rehabilitation exercises for your condition. Start each exercise slowly. Ease off the exercise if you start to have pain. Your doctor or physical therapist will tell you when you can start these exercises and which ones will work best for you. How to do the exercises  Knee-to-chest stretch    7. Do not do the knee-to-chest exercise if it causes or increases back or leg pain. 8. Lie on your back with your knees bent and your feet flat on the floor. You can put a small pillow under your head and neck if it is more comfortable. 9. Grasp your hands under one knee and bring the knee to your chest, keeping the other foot flat on the floor. 10. Keep your lower back pressed to the floor. Hold for at least 15 to 30 seconds. 11. Relax and lower the knee to the starting position. Repeat with the other leg. 12. Repeat 2 to 4 times with each leg. 13. To get more stretch, keep your other leg flat on the floor while pulling your knee to your chest.  Bridging    7. Lie on your back with both knees bent. Your knees should be bent about 90 degrees. 8. Tighten your belly muscles by pulling in your belly button toward your spine. Then push your feet into the floor, squeeze your buttocks, and lift your hips off the floor until your shoulders, hips, and knees are all in a straight line. 9. Hold for about 6 seconds as you continue to breathe normally, and then slowly lower your hips back down to the floor and rest for up to 10 seconds. 10. Repeat 8 to 12 times. Hip extension    1. Get down on your hands and knees on the floor. 2. Keeping your back and neck straight, lift one leg straight out behind you. When you lift your leg, keep your hips level. Don't let your back twist, and don't let your hip drop toward the floor. 3. Hold for 6 seconds. Repeat 8 to 12 times with each leg. 4. If you feel steady and strong when you do this exercise, you can make it more difficult. To do this, when you lift your leg, also lift the opposite arm straight out in front of you. For example, lift the left leg and the right arm at the same time. (This is sometimes called the \"bird dog exercise. \") Hold for 6 seconds, and repeat 8 to 12 times on each side. Clamshell    1. Lie on your side with a pillow under your head. Keep your feet and knees together and your knees bent. 2. Raise your top knee, but keep your feet together. Do not let your hips roll back. Your legs should open up like a clamshell. 3. Hold for 6 seconds. 4. Slowly lower your knee back down. Rest for 10 seconds. 5. Repeat 8 to 12 times. 6. Switch to your other side and repeat steps 1 through 5. Hamstring wall stretch    1. Lie on your back in a doorway, with one leg through the open door. 2. Slide your affected leg up the wall to straighten your knee. You should feel a gentle stretch down the back of your leg. 1. Do not arch your back. 2. Do not bend either knee. 3. Keep one heel touching the floor and the other heel touching the wall. Do not point your toes. 3. Hold the stretch for at least 1 minute to begin.  Then try to lengthen the time you hold the stretch to as long as 6 minutes. 4. Switch legs, and repeat steps 1 through 3.  5. Repeat 2 to 4 times. 6. If you do not have a place to do this exercise in a doorway, there is another way to do it:  7. Lie on your back, and bend one knee. 8. Loop a towel under the ball and toes of that foot, and hold the ends of the towel in your hands. 9. Straighten your knee, and slowly pull back on the towel. You should feel a gentle stretch down the back of your leg. 10. Switch legs, and repeat steps 1 through 3.  11. Repeat 2 to 4 times. Lower abdominal strengthening    1. Lie on your back with your knees bent and your feet flat on the floor. 2. Tighten your belly muscles by pulling your belly button in toward your spine. 3. Lift one foot off the floor and bring your knee toward your chest, so that your knee is straight above your hip and your leg is bent like the letter \"L. \"  4. Lift the other knee up to the same position. 5. Lower one leg at a time to the starting position. 6. Keep alternating legs until you have lifted each leg 8 to 12 times. 7. Be sure to keep your belly muscles tight and your back still as you are moving your legs. Be sure to breathe normally. Piriformis stretch    1. Lie on your back with your legs straight. 2. Lift your affected leg, and bend your knee. With your opposite hand, reach across your body, and then gently pull your knee toward your opposite shoulder. 3. Hold the stretch for 15 to 30 seconds. 4. Switch legs and repeat steps 1 through 3.  5. Repeat 2 to 4 times. Follow-up care is a key part of your treatment and safety. Be sure to make and go to all appointments, and call your doctor if you are having problems. It's also a good idea to know your test results and keep a list of the medicines you take. Where can you learn more? Go to http://garry-neyda.info/. Enter E750 in the search box to learn more about \"Sacroiliac Pain: Exercises. \"  Current as of: November 29, 2017  Content Version: 11.7  © 5177-9506 [employer]. Care instructions adapted under license by [email or web address] (which disclaims liability or warranty for this information). If you have questions about a medical condition or this instruction, always ask your healthcare professional. [address] any warranty or liability for your use of this information.

## 2018-10-29 ENCOUNTER — OFFICE VISIT (OUTPATIENT)
Dept: CARDIOLOGY CLINIC | Age: 53
End: 2018-10-29

## 2018-10-29 VITALS
DIASTOLIC BLOOD PRESSURE: 77 MMHG | WEIGHT: 214 LBS | HEIGHT: 65 IN | SYSTOLIC BLOOD PRESSURE: 128 MMHG | HEART RATE: 77 BPM | BODY MASS INDEX: 35.65 KG/M2

## 2018-10-29 DIAGNOSIS — E66.01 SEVERE OBESITY (BMI 35.0-35.9 WITH COMORBIDITY) (HCC): ICD-10-CM

## 2018-10-29 DIAGNOSIS — R94.31 ABNORMAL EKG: ICD-10-CM

## 2018-10-29 DIAGNOSIS — R73.03 PREDIABETES: ICD-10-CM

## 2018-10-29 DIAGNOSIS — R06.02 SOB (SHORTNESS OF BREATH): Primary | ICD-10-CM

## 2018-10-29 RX ORDER — DULOXETIN HYDROCHLORIDE 60 MG/1
60 CAPSULE, DELAYED RELEASE ORAL DAILY
COMMUNITY

## 2018-10-29 NOTE — PATIENT INSTRUCTIONS
Medications Discontinued During This Encounter   Medication Reason    oxyCODONE-acetaminophen (PERCOCET) 5-325 mg per tablet Therapy Completed    liothyronine (CYTOMEL) 25 mcg tablet Dose Adjustment    Levothyroxine 137 mcg cap Dose Adjustment    gabapentin (NEURONTIN) 100 mg capsule Not A Current Medication    nabumetone (RELAFEN) 750 mg tablet Not A Current Medication    FLUoxetine (PROZAC) 40 mg capsule Not A Current Medication    mirabegron ER (MYRBETRIQ) 50 mg ER tablet Not A Current Medication    levothyroxine (SYNTHROID) 175 mcg tablet Dose Adjustment    cyanocobalamin (VITAMIN B12) 1,000 mcg/mL injection Not A Current Medication          Body Mass Index: Care Instructions  Your Care Instructions    Body mass index (BMI) can help you see if your weight is raising your risk for health problems. It uses a formula to compare how much you weigh with how tall you are. · A BMI lower than 18.5 is considered underweight. · A BMI between 18.5 and 24.9 is considered healthy. · A BMI between 25 and 29.9 is considered overweight. A BMI of 30 or higher is considered obese. If your BMI is in the normal range, it means that you have a lower risk for weight-related health problems. If your BMI is in the overweight or obese range, you may be at increased risk for weight-related health problems, such as high blood pressure, heart disease, stroke, arthritis or joint pain, and diabetes. If your BMI is in the underweight range, you may be at increased risk for health problems such as fatigue, lower protection (immunity) against illness, muscle loss, bone loss, hair loss, and hormone problems. BMI is just one measure of your risk for weight-related health problems. You may be at higher risk for health problems if you are not active, you eat an unhealthy diet, or you drink too much alcohol or use tobacco products. Follow-up care is a key part of your treatment and safety.  Be sure to make and go to all appointments, and call your doctor if you are having problems. It's also a good idea to know your test results and keep a list of the medicines you take. How can you care for yourself at home? · Practice healthy eating habits. This includes eating plenty of fruits, vegetables, whole grains, lean protein, and low-fat dairy. · If your doctor recommends it, get more exercise. Walking is a good choice. Bit by bit, increase the amount you walk every day. Try for at least 30 minutes on most days of the week. · Do not smoke. Smoking can increase your risk for health problems. If you need help quitting, talk to your doctor about stop-smoking programs and medicines. These can increase your chances of quitting for good. · Limit alcohol to 2 drinks a day for men and 1 drink a day for women. Too much alcohol can cause health problems. If you have a BMI higher than 25  · Your doctor may do other tests to check your risk for weight-related health problems. This may include measuring the distance around your waist. A waist measurement of more than 40 inches in men or 35 inches in women can increase the risk of weight-related health problems. · Talk with your doctor about steps you can take to stay healthy or improve your health. You may need to make lifestyle changes to lose weight and stay healthy, such as changing your diet and getting regular exercise. If you have a BMI lower than 18.5  · Your doctor may do other tests to check your risk for health problems. · Talk with your doctor about steps you can take to stay healthy or improve your health. You may need to make lifestyle changes to gain or maintain weight and stay healthy, such as getting more healthy foods in your diet and doing exercises to build muscle. Where can you learn more? Go to http://garry-neyda.info/. Enter S176 in the search box to learn more about \"Body Mass Index: Care Instructions. \"  Current as of: June 26, 2018  Content Version: 11.8  © 5908-3295 [employer]. Care instructions adapted under license by [employer] (which disclaims liability or warranty for this information). If you have questions about a medical condition or this instruction, always ask your healthcare professional. [place] [address] any warranty or liability for your use of this information.

## 2018-10-29 NOTE — PROGRESS NOTES
1. Have you been to the ER, urgent care clinic since your last visit? Hospitalized since your last visit?     no    2. Have you seen or consulted any other health care providers outside of the [address] [place] [address] since your last visit? Include any pap smears or colon screening. Yes,pcp    3. Since your last visit, have you had any of the following symptoms? Sob, dizziness sometimes,           4. Have you had any blood work, X-rays or cardiac testing? Yes [facility] blood work and chest xray             5.  Where do you normally have your labs drawn? [facility]    6. Do you need any refills today?    no

## 2018-10-29 NOTE — PROGRESS NOTES
HISTORY OF PRESENT ILLNESS  Silvana Ganser is a 46 y.o. female. New Patient   The history is provided by the medical records and patient. Associated symptoms include chest pain and shortness of breath. Pertinent negatives include no headaches. Shortness of Breath   The history is provided by the patient. This is a new problem. The problem occurs intermittently. The current episode started more than 1 week ago (4/18). The problem has been gradually worsening (8/18). Associated symptoms include chest pain and leg swelling. Pertinent negatives include no fever, no headaches, no cough, no wheezing, no PND, no orthopnea, no vomiting, no rash and no claudication. Precipitated by: nothing. She has tried nothing for the symptoms. Chest Pain (Angina)    The history is provided by the patient. This is a new problem. The current episode started more than 1 week ago (yrs). The pain is associated with rest (usually AMs). The pain is present in the substernal region. The quality of the pain is described as pressure-like. The pain does not radiate. Associated symptoms include diaphoresis (sometimes), lower extremity edema and shortness of breath. Pertinent negatives include no claudication, no cough, no dizziness, no fever, no headaches, no malaise/fatigue, no nausea, no orthopnea, no palpitations, no PND and no vomiting. She has tried antacids (milk, coke) for the symptoms. The treatment provided significant relief. Leg Swelling   The history is provided by the patient. This is a new problem. The current episode started more than 1 week ago (yrs). The problem occurs every several days. Associated symptoms include chest pain and shortness of breath. Pertinent negatives include no headaches. The symptoms are aggravated by standing. The symptoms are relieved by rest.       Review of Systems   Constitutional: Positive for diaphoresis (sometimes). Negative for chills, fever, malaise/fatigue and weight loss.    HENT: Negative for nosebleeds. Eyes: Negative for discharge. Respiratory: Positive for shortness of breath. Negative for cough and wheezing. Cardiovascular: Positive for chest pain and leg swelling. Negative for palpitations, orthopnea, claudication and PND. Gastrointestinal: Negative for diarrhea, nausea and vomiting. Genitourinary: Negative for dysuria and hematuria. Musculoskeletal: Negative for joint pain. Skin: Negative for rash. Neurological: Negative for dizziness, seizures, loss of consciousness and headaches. Endo/Heme/Allergies: Negative for polydipsia. Does not bruise/bleed easily. Psychiatric/Behavioral: Negative for depression and substance abuse. The patient does not have insomnia. Allergies   Allergen Reactions    Morphine Itching    Naproxen Itching     Swelling, hives, difficulty breathing       Past Medical History:   Diagnosis Date    Arthritis     Depressed 10/1/2015    Depression     Hypothyroidism     Incontinence     Myofascial muscle pain 10/1/2015    Narcolepsy     Psychiatric disorder     manic depression    Sleep apnea     Unspecified sleep apnea     uses cpap machine       Family History   Problem Relation Age of Onset    Cancer Mother     Heart Disease Father     Kidney Disease Father     Diabetes Father     No Known Problems Brother     Heart Attack Neg Hx     Stroke Neg Hx        Social History     Tobacco Use    Smoking status: Former Smoker     Packs/day: 0.50     Last attempt to quit: 2017     Years since quittin.1    Smokeless tobacco: Never Used   Substance Use Topics    Alcohol use: Yes     Alcohol/week: 1.2 oz     Types: 2 Shots of liquor per week     Comment: weekends and few times per week    Drug use: No        Current Outpatient Medications   Medication Sig    DULoxetine (CYMBALTA) 60 mg capsule Take 60 mg by mouth daily.     cyclobenzaprine (FLEXERIL) 10 mg tablet TAKE 1 TABLET BY MOUTH DAILY AS NEEDED FOR MUSCLE SPASMS    levothyroxine (SYNTHROID) 150 mcg tablet TK 1 T PO ONCE A DAY    liothyronine (CYTOMEL) 5 mcg tablet TK 1 T PO D    ALPRAZolam (XANAX) 0.5 mg tablet TK 1 T PO ONCE A DAY PRN FOR ANXIETY. No current facility-administered medications for this visit. Past Surgical History:   Procedure Laterality Date    HX BLADDER SUSPENSION      HX CHOLECYSTECTOMY      HX HYSTERECTOMY      HX LUMBAR FUSION  2000    L4-L5-S1 decompression and fusion, Dr. Lizette Hinojosa  2010    removal of segmental instrumentation, Dr. Byron Tay  2014    and removed bursitis from right shoulder    HX ORTHOPAEDIC      Right Foot surgery    HX SHOULDER ARTHROSCOPY Right        Visit Vitals  /77   Pulse 77   Ht 5' 5\" (1.651 m)   Wt 97.1 kg (214 lb)   BMI 35.61 kg/m²       Diagnostic Studies:  I have reviewed the relevant tests done on the patient and show as follows  EKG tracings reviewed by me today. No flowsheet data found. Ms. Jose Cooper has a reminder for a \"due or due soon\" health maintenance. I have asked that she contact her primary care provider for follow-up on this health maintenance. Physical Exam   Constitutional: She is oriented to person, place, and time. She appears well-developed and well-nourished. No distress. Severely obese   HENT:   Head: Normocephalic and atraumatic. Mouth/Throat: Normal dentition. Eyes: Right eye exhibits no discharge. Left eye exhibits no discharge. No scleral icterus. Neck: Neck supple. No JVD present. Carotid bruit is not present. No thyromegaly present. Cardiovascular: Normal rate, regular rhythm, S1 normal, S2 normal, normal heart sounds and intact distal pulses. Exam reveals no gallop and no friction rub. No murmur heard. Pulmonary/Chest: Effort normal and breath sounds normal. She has no wheezes. She has no rales. Abdominal: Soft. She exhibits no mass. There is no tenderness. Musculoskeletal: She exhibits no edema. Lymphadenopathy:        Right cervical: No superficial cervical adenopathy present. Left cervical: No superficial cervical adenopathy present. Neurological: She is alert and oriented to person, place, and time. Skin: Skin is warm and dry. No rash noted. Psychiatric: She has a normal mood and affect. Her behavior is normal.       ASSESSMENT and PLAN    Shortness of breath is atypical but considering other of his neck with including obesity prediabetes and intermittent hypertension which has not been reviewed yet, would recommend cardiac evaluation with echo and a stress test.  Further treatment depending on the findings. Diagnoses and all orders for this visit:    1. SOB (shortness of breath)  -     AMB POC EKG ROUTINE W/ 12 LEADS, INTER & REP  -     ECHO ADULT COMPLETE; Future  -     NUCLEAR CARDIAC STRESS TEST; Future    2. Severe obesity (BMI 35.0-35.9 with comorbidity) ([facility] [address]    3. Abnormal EKG  -     ECHO ADULT COMPLETE; Future  -     NUCLEAR CARDIAC STRESS TEST; Future    4. Prediabetes        Pertinent laboratory and test data reviewed and discussed with patient.   See patient instructions also for other medical advice given    Medications Discontinued During This Encounter   Medication Reason    oxyCODONE-acetaminophen (PERCOCET) 5-325 mg per tablet Therapy Completed    liothyronine (CYTOMEL) 25 mcg tablet Dose Adjustment    Levothyroxine 137 mcg cap Dose Adjustment    gabapentin (NEURONTIN) 100 mg capsule Not A Current Medication    nabumetone (RELAFEN) 750 mg tablet Not A Current Medication    FLUoxetine (PROZAC) 40 mg capsule Not A Current Medication    mirabegron ER (MYRBETRIQ) 50 mg ER tablet Not A Current Medication    levothyroxine (SYNTHROID) 175 mcg tablet Dose Adjustment    cyanocobalamin (VITAMIN B12) 1,000 mcg/mL injection Not A Current Medication       Follow-up Disposition:  Return in about 1 month (around 11/29/2018), or if symptoms worsen or fail to improve, for post test.

## 2018-11-29 ENCOUNTER — OFFICE VISIT (OUTPATIENT)
Dept: CARDIOLOGY CLINIC | Age: 53
End: 2018-11-29

## 2018-11-29 VITALS
HEART RATE: 80 BPM | SYSTOLIC BLOOD PRESSURE: 144 MMHG | BODY MASS INDEX: 36.32 KG/M2 | HEIGHT: 65 IN | WEIGHT: 218 LBS | DIASTOLIC BLOOD PRESSURE: 82 MMHG

## 2018-11-29 DIAGNOSIS — R06.02 SOB (SHORTNESS OF BREATH): Primary | ICD-10-CM

## 2018-11-29 DIAGNOSIS — E66.01 SEVERE OBESITY (BMI 35.0-35.9 WITH COMORBIDITY) (HCC): ICD-10-CM

## 2018-11-29 DIAGNOSIS — I50.31 ACUTE DIASTOLIC CHF (CONGESTIVE HEART FAILURE) (HCC): ICD-10-CM

## 2018-11-29 RX ORDER — FUROSEMIDE 20 MG/1
20 TABLET ORAL
Qty: 45 TAB | Refills: 6 | Status: SHIPPED | OUTPATIENT
Start: 2018-11-29 | End: 2019-04-26 | Stop reason: SDUPTHER

## 2018-11-29 RX ORDER — LEVOTHYROXINE SODIUM 137 UG/1
0.12 TABLET ORAL
COMMUNITY

## 2018-11-29 NOTE — PROGRESS NOTES
HISTORY OF PRESENT ILLNESS  Christine Belcher is a 46 y.o. female. New Patient   The history is provided by the medical records and patient. Associated symptoms include shortness of breath. Pertinent negatives include no chest pain and no headaches. Shortness of Breath   The history is provided by the patient. This is a new problem. The problem occurs intermittently. The current episode started more than 1 week ago (4/18). The problem has been gradually worsening (8/18). Associated symptoms include orthopnea and leg swelling. Pertinent negatives include no fever, no headaches, no cough, no wheezing, no PND, no chest pain, no vomiting, no rash and no claudication. Precipitated by: nothing. She has tried nothing for the symptoms. Chest Pain (Angina)    The history is provided by the patient. This is a new problem. The current episode started more than 1 week ago (yrs). The problem has been resolved. The pain is associated with rest (usually AMs). The pain is present in the substernal region. The quality of the pain is described as pressure-like. The pain does not radiate. Associated symptoms include lower extremity edema, orthopnea and shortness of breath. Pertinent negatives include no claudication, no cough, no diaphoresis (sometimes), no dizziness, no fever, no headaches, no malaise/fatigue, no nausea, no palpitations, no PND and no vomiting. She has tried antacids (milk, coke) for the symptoms. The treatment provided significant relief. Leg Swelling   The history is provided by the patient. This is a new problem. The current episode started more than 1 week ago (yrs). The problem occurs every several days. Associated symptoms include shortness of breath. Pertinent negatives include no chest pain and no headaches. The symptoms are aggravated by standing.  The symptoms are relieved by rest.       Review of Systems   Constitutional: Negative for chills, diaphoresis (sometimes), fever, malaise/fatigue and weight loss.   HENT: Negative for nosebleeds. Eyes: Negative for discharge. Respiratory: Positive for shortness of breath. Negative for cough and wheezing. Cardiovascular: Positive for orthopnea and leg swelling. Negative for chest pain, palpitations, claudication and PND. Gastrointestinal: Negative for diarrhea, nausea and vomiting. Genitourinary: Negative for dysuria and hematuria. Musculoskeletal: Negative for joint pain. Skin: Negative for rash. Neurological: Negative for dizziness, seizures, loss of consciousness and headaches. Endo/Heme/Allergies: Negative for polydipsia. Does not bruise/bleed easily. Psychiatric/Behavioral: Negative for depression and substance abuse. The patient does not have insomnia. Allergies   Allergen Reactions    Morphine Itching    Naproxen Itching     Swelling, hives, difficulty breathing       Past Medical History:   Diagnosis Date    Arthritis     Depressed 10/1/2015    Depression     Hypothyroidism     Incontinence     Myofascial muscle pain 10/1/2015    Narcolepsy     Psychiatric disorder     manic depression    Sleep apnea     Unspecified sleep apnea     uses cpap machine       Family History   Problem Relation Age of Onset    Cancer Mother     Heart Disease Father     Kidney Disease Father     Diabetes Father     No Known Problems Brother     Heart Attack Neg Hx     Stroke Neg Hx        Social History     Tobacco Use    Smoking status: Former Smoker     Packs/day: 0.50     Last attempt to quit: 2017     Years since quittin.2    Smokeless tobacco: Never Used   Substance Use Topics    Alcohol use: Yes     Alcohol/week: 1.2 oz     Types: 2 Shots of liquor per week     Comment: weekends and few times per week    Drug use: No        Current Outpatient Medications   Medication Sig    levothyroxine (SYNTHROID) 137 mcg tablet Take  by mouth Daily (before breakfast).  DULoxetine (CYMBALTA) 60 mg capsule Take 60 mg by mouth daily.     cyclobenzaprine (FLEXERIL) 10 mg tablet TAKE 1 TABLET BY MOUTH DAILY AS NEEDED FOR MUSCLE SPASMS    liothyronine (CYTOMEL) 5 mcg tablet TK 1 T PO D    ALPRAZolam (XANAX) 0.5 mg tablet TK 1 T PO ONCE A DAY PRN FOR ANXIETY. No current facility-administered medications for this visit. Past Surgical History:   Procedure Laterality Date    HX BLADDER SUSPENSION      HX CHOLECYSTECTOMY      HX HYSTERECTOMY      HX LUMBAR FUSION  2000    L4-L5-S1 decompression and fusion, Dr. Nohemi Cazares  2010    removal of segmental instrumentation, Dr. Todd Blevins  2014    and removed bursitis from right shoulder    HX ORTHOPAEDIC      Right Foot surgery    HX SHOULDER ARTHROSCOPY Right        Visit Vitals  /82   Pulse 80   Ht 5' 5\" (1.651 m)   Wt 98.9 kg (218 lb)   BMI 36.28 kg/m²       Diagnostic Studies:  I have reviewed the relevant tests done on the patient and show as follows  EKG tracings reviewed by me today. No flowsheet data found. Ms. Heide Sloan has a reminder for a \"due or due soon\" health maintenance. I have asked that she contact her primary care provider for follow-up on this health maintenance. Physical Exam   Constitutional: She is oriented to person, place, and time. She appears well-developed and well-nourished. No distress. Severely obese   HENT:   Head: Normocephalic and atraumatic. Mouth/Throat: Normal dentition. Eyes: Right eye exhibits no discharge. Left eye exhibits no discharge. No scleral icterus. Neck: Neck supple. No JVD present. Carotid bruit is not present. No thyromegaly present. Cardiovascular: Normal rate, regular rhythm, S1 normal, S2 normal, normal heart sounds and intact distal pulses. Exam reveals no gallop and no friction rub. No murmur heard. Pulmonary/Chest: Effort normal and breath sounds normal. She has no wheezes. She has no rales. Abdominal: Soft. She exhibits no mass. There is no tenderness. Musculoskeletal: She exhibits no edema. Lymphadenopathy:        Right cervical: No superficial cervical adenopathy present. Left cervical: No superficial cervical adenopathy present. Neurological: She is alert and oriented to person, place, and time. Skin: Skin is warm and dry. No rash noted. Psychiatric: She has a normal mood and affect. Her behavior is normal.     11/8/2018  Nuclear Stress Test     Negative myocardial perfusion imaging. Myocardial perfusion imaging supports a low risk stress test.   There is no prior study available for comparison. .     Echo 11/8/2018  Interpretation Summary        · Estimated left ventricular ejection fraction is 56 - 60%. Normal left ventricular wall motion, no regional wall motion abnormality noted. Mild (grade 1) left ventricular diastolic dysfunction. · Normal right ventricular size and function. · Mild mitral annular calcification. Mild mitral valve regurgitation. · Mild tricuspid valve regurgitation is present. There is no evidence of pulmonary hypertension. ASSESSMENT and PLAN    Shortness of breath is atypical but considering other of his neck with including obesity prediabetes and intermittent hypertension which has not been reviewed yet, would recommend cardiac evaluation with echo and a stress test.  Further treatment depending on the findings. 11/29/2018 - Continues to have SOB with orthopnea and edema. Has 4 lb weight gain since last ov. NST and Echo unremarkable. Diet reviewed she eats a very large amount of salt daily - cooks with salt and adds a very large amount to all foods including toast.  Low sodium diet discussed and diet provided. Will Rx Lasix PRN - which she has taken in the past with improvement in symptoms. Diagnoses and all orders for this visit:    1. SOB (shortness of breath)    2. Severe obesity (BMI 35.0-35.9 with comorbidity) ([facility] [address]    3.  Acute diastolic CHF (congestive heart failure) (HCC)  -     furosemide (LASIX) 20 mg tablet; Take 1 Tab by mouth two (2) times daily as needed. -     METABOLIC PANEL, BASIC; Future        Pertinent laboratory and test data reviewed and discussed with patient. See patient instructions also for other medical advice given    Medications Discontinued During This Encounter   Medication Reason    levothyroxine (SYNTHROID) 690 mcg tablet Duplicate Order       Follow-up Disposition:  Return in about 6 months (around 5/29/2019), or if symptoms worsen or fail to improve. I have independently evaluated and examined the patient. Echo shows good normal ejection fraction stress test is unremarkable. She has mild mitral regurgitation. She has gained 4 pounds and intermittently gets edema for which PRN Lasix was given. She is using too much salt and she admitted to that. Detailed discussion regarding diet modification. She will try her best.  All relevant labs and testing data's are reviewed. Care plan discussed and updated after review.   Nataly Navarrete MD

## 2018-11-29 NOTE — PROGRESS NOTES
1. Have you been to the ER, urgent care clinic since your last visit? Hospitalized since your last visit? No    2. Have you seen or consulted any other health care providers outside of the [facility] since your last visit? Include any pap smears or colon screening. No     3. Since your last visit, have you had any of the following symptoms? shortness of breath. 4.  Have you had any blood work, X-rays or cardiac testing? No             5.  Where do you normally have your labs drawn? [facility]    6. Do you need any refills today?    No

## 2018-11-29 NOTE — PATIENT INSTRUCTIONS
Take lasix as needed    Low sodium diet         Low Sodium Diet (2,000 Milligram): Care Instructions  Your Care Instructions    Too much sodium causes your body to hold on to extra water. This can raise your blood pressure and force your heart and kidneys to work harder. In very serious cases, this could cause you to be put in the hospital. It might even be life-threatening. By limiting sodium, you will feel better and lower your risk of serious problems. The most common source of sodium is salt. People get most of the salt in their diet from canned, prepared, and packaged foods. Fast food and restaurant meals also are very high in sodium. Your doctor will probably limit your sodium to less than 2,000 milligrams (mg) a day. This limit counts all the sodium in prepared and packaged foods and any salt you add to your food. Follow-up care is a key part of your treatment and safety. Be sure to make and go to all appointments, and call your doctor if you are having problems. It's also a good idea to know your test results and keep a list of the medicines you take. How can you care for yourself at home? Read food labels  · Read labels on cans and food packages. The labels tell you how much sodium is in each serving. Make sure that you look at the serving size. If you eat more than the serving size, you have eaten more sodium. · Food labels also tell you the Percent Daily Value for sodium. Choose products with low Percent Daily Values for sodium. · Be aware that sodium can come in forms other than salt, including monosodium glutamate (MSG), sodium citrate, and sodium bicarbonate (baking soda). MSG is often added to Asian food. When you eat out, you can sometimes ask for food without MSG or added salt. Buy low-sodium foods  · Buy foods that are labeled \"unsalted\" (no salt added), \"sodium-free\" (less than 5 mg of sodium per serving), or \"low-sodium\" (less than 140 mg of sodium per serving).  Foods labeled \"reduced-sodium\" and \"light sodium\" may still have too much sodium. Be sure to read the label to see how much sodium you are getting. · Buy fresh vegetables, or frozen vegetables without added sauces. Buy low-sodium versions of canned vegetables, soups, and other canned goods. Prepare low-sodium meals  · Cut back on the amount of salt you use in cooking. This will help you adjust to the taste. Do not add salt after cooking. One teaspoon of salt has about 2,300 mg of sodium. · Take the salt shaker off the table. · Flavor your food with garlic, lemon juice, onion, vinegar, herbs, and spices. Do not use soy sauce, lite soy sauce, steak sauce, onion salt, garlic salt, celery salt, mustard, or ketchup on your food. · Use low-sodium salad dressings, sauces, and ketchup. Or make your own salad dressings and sauces without adding salt. · Use less salt (or none) when recipes call for it. You can often use half the salt a recipe calls for without losing flavor. Other foods such as rice, pasta, and grains do not need added salt. · Rinse canned vegetables, and cook them in fresh water. This removes some--but not all--of the salt. · Avoid water that is naturally high in sodium or that has been treated with water softeners, which add sodium. Call your local water company to find out the sodium content of your water supply. If you buy bottled water, read the label and choose a sodium-free brand. Avoid high-sodium foods  · Avoid eating:  ? Smoked, cured, salted, and canned meat, fish, and poultry. ? Ham, monsalve, hot dogs, and luncheon meats. ? Regular, hard, and processed cheese and regular peanut butter. ? Crackers with salted tops, and other salted snack foods such as pretzels, chips, and salted popcorn. ? Frozen prepared meals, unless labeled low-sodium. ? Canned and dried soups, broths, and bouillon, unless labeled sodium-free or low-sodium. ? Canned vegetables, unless labeled sodium-free or low-sodium. ?  [place] fries, pizza, tacos, and other fast foods. ? Pickles, olives, ketchup, and other condiments, especially soy sauce, unless labeled sodium-free or low-sodium. Where can you learn more? Go to http://garry-neyda.info/. Enter G053 in the search box to learn more about \"Low Sodium Diet (2,000 Milligram): Care Instructions. \"  Current as of: March 29, 2018  Content Version: 11.8  © 0207-7377 [employer]wise, [employer]. Care instructions adapted under license by [employer] (which disclaims liability or warranty for this information). If you have questions about a medical condition or this instruction, always ask your healthcare professional. [address] any warranty or liability for your use of this information.

## 2019-04-26 ENCOUNTER — TELEPHONE (OUTPATIENT)
Dept: CARDIOLOGY CLINIC | Age: 54
End: 2019-04-26

## 2019-04-26 DIAGNOSIS — I50.31 ACUTE DIASTOLIC CHF (CONGESTIVE HEART FAILURE) (HCC): ICD-10-CM

## 2019-04-26 DIAGNOSIS — Z51.81 THERAPEUTIC DRUG MONITORING: Primary | ICD-10-CM

## 2019-04-26 RX ORDER — FUROSEMIDE 20 MG/1
TABLET ORAL
Qty: 45 TAB | Refills: 0 | Status: SHIPPED | OUTPATIENT
Start: 2019-04-26 | End: 2022-08-17

## 2019-04-26 NOTE — TELEPHONE ENCOUNTER
Per NP Ulices's written order BMP ordered for patient. Patient called and made aware of upcoming labs, slip faxed to outpatient labs at [facility] and mailed to address on file.

## 2019-05-02 ENCOUNTER — OFFICE VISIT (OUTPATIENT)
Dept: PAIN MANAGEMENT | Age: 54
End: 2019-05-02

## 2019-05-02 VITALS
HEIGHT: 65 IN | TEMPERATURE: 96.3 F | WEIGHT: 213 LBS | OXYGEN SATURATION: 98 % | HEART RATE: 97 BPM | BODY MASS INDEX: 35.49 KG/M2 | SYSTOLIC BLOOD PRESSURE: 130 MMHG | DIASTOLIC BLOOD PRESSURE: 89 MMHG | RESPIRATION RATE: 18 BRPM

## 2019-05-02 DIAGNOSIS — M46.1 SACROILIITIS (HCC): Primary | ICD-10-CM

## 2019-05-02 DIAGNOSIS — G89.4 CHRONIC PAIN SYNDROME: ICD-10-CM

## 2019-05-02 RX ORDER — CHOLECALCIFEROL TAB 125 MCG (5000 UNIT) 125 MCG
5000 TAB ORAL DAILY
COMMUNITY

## 2019-05-02 RX ORDER — FLUOXETINE HYDROCHLORIDE 40 MG/1
40 CAPSULE ORAL DAILY
COMMUNITY
End: 2022-08-17

## 2019-05-02 RX ORDER — NABUMETONE 750 MG/1
750 TABLET, FILM COATED ORAL DAILY
COMMUNITY
End: 2022-08-17

## 2019-05-02 NOTE — PROGRESS NOTES
Nursing Notes    Patient presents to the office today for a new pt consult with Dr. Madelin Aase for her chronic back pain  Patient rates her pain at 7/10 on the numerical pain scale.  reviewed NO  Any aberrancies noted on  NO  Last opioid agreement N/A  Last urine drug screen N/A    Comments:     POC UDS was not performed in office today    Any new labs or imaging since last appointment? NO    Have you been to an emergency room (ER) or urgent care clinic since your last visit? NO            Have you been hospitalized since your last visit? NO     If yes, where, when, and reason for visit? Have you seen or consulted any other health care providers outside of the [address] [place] [address]  since your last visit? NO     If yes, where, when, and reason for visit? Ms. Dk Mercado has a reminder for a \"due or due soon\" health maintenance. I have asked that she contact her primary care provider for follow-up on this health maintenance.     3 most recent PHQ Screens 5/2/2019   Little interest or pleasure in doing things Nearly every day   Feeling down, depressed, irritable, or hopeless Nearly every day   Total Score PHQ 2 6   Trouble falling or staying asleep, or sleeping too much Nearly every day   Feeling tired or having little energy Nearly every day   Poor appetite, weight loss, or overeating Not at all   Feeling bad about yourself - or that you are a failure or have let yourself or your family down Nearly every day   Trouble concentrating on things such as school, work, reading, or watching TV Not at all   Moving or speaking so slowly that other people could have noticed; or the opposite being so fidgety that others notice Not at all   Thoughts of being better off dead, or hurting yourself in some way Not at all   PHQ 9 Score 15   How difficult have these problems made it for you to do your work, take care of your home and get along with others Very difficult     Pt states that she is being treated for her depression by her pcp. Provider made aware of the above score.

## 2019-05-02 NOTE — PATIENT INSTRUCTIONS
Piriformis Syndrome: Exercises  Your Care Instructions  Here are some examples of typical rehabilitation exercises for your condition. Start each exercise slowly. Ease off the exercise if you start to have pain. Your doctor or physical therapist will tell you when you can start these exercises and which ones will work best for you. How to do the exercises  Hip rotator stretch    1. Lie on your back with both knees bent and your feet flat on the floor. 2. Put the ankle of your affected leg on your opposite thigh near your knee. 3. Use your hand to gently push your knee (on your affected leg) away from your body until you feel a gentle stretch around your hip. 4. Hold the stretch for 15 to 30 seconds. 5. Repeat 2 to 4 times. 6. Switch legs and repeat steps 1 through 5. Piriformis stretch    1. Lie on your back with your legs straight. 2. Lift your affected leg and bend your knee. With your opposite hand, reach across your body, and then gently pull your knee toward your opposite shoulder. 3. Hold the stretch for 15 to 30 seconds. 4. Repeat with your other leg. 5. Repeat 2 to 4 times on each side. Lower abdominal strengthening    1. Lie on your back with your knees bent and your feet flat on the floor. 2. Tighten your belly muscles by pulling your belly button in toward your spine. 3. Lift one foot off the floor and bring your knee toward your chest, so that your knee is straight above your hip and your leg is bent like the letter \"L. \"  4. Lift the other knee up to the same position. 5. Lower one leg at a time to the starting position. 6. Keep alternating legs until you have lifted each leg 8 to 12 times. 7. Be sure to keep your belly muscles tight and your back still as you are moving your legs. Be sure to breathe normally. Follow-up care is a key part of your treatment and safety. Be sure to make and go to all appointments, and call your doctor if you are having problems.  It's also a good idea to know your test results and keep a list of the medicines you take. Current as of: September 20, 2018  Content Version: 11.9  © 1375-6261 [employer]. Care instructions adapted under license by [employer] (which disclaims liability or warranty for this information). If you have questions about a medical condition or this instruction, always ask your healthcare professional. [place] [address] any warranty or liability for your use of this information.

## 2019-05-02 NOTE — PROGRESS NOTES
LESLY -54%    HPI:  Dash Aguero is a 53 y.o. female here for initial visit referred by Dr. Zechariah Yee for evaluation of bilateral sacroiliac joints and consideration for bilateral sacroiliac Synergy treatment. Patient with bilateral sacroiliac joints and at the lumbosacral junction. The pain is constant and stabbing in nature. Pain ranges from 4-10 over 10. Worse with prolonged sitting, supine, prolonged walking, prolonged standing. Pain improves with nothing, sometimes sleep in fetal position. Interventional history includes sacral lateral branch blocks done twice bilaterally with greater than 70% relief each time in January 2018. She also has a history of lumbar fusion L4-S1 from 3 separate surgeries, last done in 2010. Patient reports lumbosacral pain that worsens with physical activity and improves with rest.  There is also difficulty walking, climbing stairs, or getting in and out of low chairs, bath tubs, or low cars. There is increased lumbosacral stiffness, especially in the morning or after prolonged sitting. Most recent Physical Therapy for primary pain issue was 2010. She still does that HEP.     ROS:Review of systems is negative for fever, chills, nausea, vomiting, diarrhea, , chest pain, , abdominal pain, weakness, trouble swallowing, acute changes in vision, acute changes in hearing, falls, dizziness, bladder incontinence, bowel incontinence, anxiety, suicidal ideation, homicidal ideation, alcohol use. Review of systems positive for constipation, chronic trouble breathing, depression    Imaging: Report from lumbar MRI from February 6, 2018 showed, ''\"'''\"\"\"\"\"\"\"\"\"\"\"\"\"\"\"\"\"\"\"FINDINGS:      Prior L5-S1 anterior interbody fusion. Also intervertebral spacer placement of  L4-5. Prior posterior decompression. Previously there were pedicle screws, now  removed. No fracture or bone destruction. Grade 1 L5 anterolisthesis. Normal  vertebral body heights.  No concerning marrow signal. Mild discogenic endplate  edema at anterior T12-L1. Normal conus at T12-L1. No pathologic intrathecal  enhancement.      Axial imaging correlation:     T12-L1:  Patent canal and foramina.     L1-L2: Patent canal and foramina.      L2-L3: Mild broad-based disc bulge and facet arthropathy. Patent canal and  foramina.      L3-L4: Bilateral hypertrophic facet arthropathy. Minimal impression upon thecal  sac. Patent canal and foramina.       L4-L5: Postoperative level. Some globular fatty material at the posterior disc  space with loss of signal on T1 fat-suppressed imaging. Bilateral facet  arthropathy. Patent canal. Patent foramina.       L5-S1: Postoperative level. Listhesis with uncovering of disc space. No focal  disc material. Bilateral facet arthropathy with low-grade inflammation similar  to prior. Patent canal and foramina.      Incidental imaging of regional soft tissues unremarkable.     Small Tarlov cysts at the mid-level sacrum as before.     IMPRESSION  IMPRESSION:     1. Stable appearance of postsurgical and degenerative findings along the lumbar  spine as delineated above and previously. Patent canal and foramina.   \"\"\"\"\"\"\"\"\"\"\"\"\"\"\"\"\"\"\"\"\"\"\"\"\"\"\"\"\"\"\"\"\"\"\"       Vitals:    05/02/19 1444   BP: 130/89   Pulse: 97   Resp: 18   Temp: 96.3 °F (35.7 °C)   TempSrc: Oral   SpO2: 98%   Weight: 96.6 kg (213 lb)   Height: 5' 5\" (1.651 m)   PainSc:   7   PainLoc: Back        PE:  AFVSS, no acute distress, endomorphic body habitus. A&OXs 3.  normocephalic, atraumatic. Conjugate gaze, clear sclerae. Speech is clear and appropriate. Mood is pleasant and appropriate. Patient is cooperative. Breathing is nonlabored    Positive Shanti fingers bilaterally  Increased bilateral sacroiliac region pain with bilateral SIJ compression and distraction. Positive stork test bilaterally. Negative seated straight leg raise bilaterally. Positive FABERs on the right and the left. Negative Stinchfield's on the right and the left. Negative FADIRS on the right and the left. Gait is within functional limits with antalgia. Balance is within functional limits. Primary Care Physician  Dearl Catrachita  Lily CALABRESE [address]  [phone]      PHQ -- .  3 most recent PHQ Screens 5/2/2019   Little interest or pleasure in doing things Nearly every day   Feeling down, depressed, irritable, or hopeless Nearly every day   Total Score PHQ 2 6   Trouble falling or staying asleep, or sleeping too much Nearly every day   Feeling tired or having little energy Nearly every day   Poor appetite, weight loss, or overeating Not at all   Feeling bad about yourself - or that you are a failure or have let yourself or your family down Nearly every day   Trouble concentrating on things such as school, work, reading, or watching TV Not at all   Moving or speaking so slowly that other people could have noticed; or the opposite being so fidgety that others notice Not at all   Thoughts of being better off dead, or hurting yourself in some way Not at all   PHQ 9 Score 15   How difficult have these problems made it for you to do your work, take care of your home and get along with others Very difficult         Assessment/Plan:     ICD-10-CM ICD-9-CM    1. Sacroiliitis (HCC) M46.1 720.2    2. Chronic pain syndrome G89.4 338.4         --Patient with significant lumbosacral pain certainly with contribution from bilateral sacroiliac joints confirmed by positive sacral lateral branch blocks done twice in 2018 with greater than 70% pain relief from each one. She did not follow through with the radiofrequency ablation at that time secondary to anxiety about the procedure. She is now mentally prepared to repeat the tests if needed and to follow through with the radiofrequency ablation to bilateral sacral lateral branches.   I do not feel there is any medical reason to repeat the sacral lateral branch blocks and we should try and schedule the patient directly for the radiofrequency ablations which will decrease her risk by limiting her number of procedures. --Later, we may consider treatment options such as peripheral nerve stimulators, regenerative therapy techniques, SIJ orthotic, or referral for consideration of possible fusion. Pt will return in 4months for f/u with Josemanuel/[facility]    GOALS:  To establish complementary and integrative plan of care to address chronic pain issues while minimizing pharmaceuticals to maximize patient's function improve quality of life. A total of 45 minutes were spent with the patient, of which more than half of the time was spent counseling and/or coordinating care. F/u:. Follow-up and Dispositions    · Return in about 4 months (around 2019) for 30 min. Social History     Socioeconomic History    Marital status: LEGALLY      Spouse name: Not on file    Number of children: Not on file    Years of education: Not on file    Highest education level: Not on file   Occupational History    Not on file   Social Needs    Financial resource strain: Not on file    Food insecurity:     Worry: Not on file     Inability: Not on file    Transportation needs:     Medical: Not on file     Non-medical: Not on file   Tobacco Use    Smoking status: Former Smoker     Packs/day: 0.50     Last attempt to quit: 2017     Years since quittin.6    Smokeless tobacco: Never Used   Substance and Sexual Activity    Alcohol use:  Yes     Alcohol/week: 1.2 oz     Types: 2 Shots of liquor per week     Comment: weekends and few times per week    Drug use: No    Sexual activity: Yes     Birth control/protection: Surgical     Comment: Hysterectomy   Lifestyle    Physical activity:     Days per week: Not on file     Minutes per session: Not on file    Stress: Not on file   Relationships    Social connections:     Talks on phone: Not on file     Gets together: Not on file     Attends Denominational service: Not on file     Active member of club or organization: Not on file     Attends meetings of clubs or organizations: Not on file     Relationship status: Not on file    Intimate partner violence:     Fear of current or ex partner: Not on file     Emotionally abused: Not on file     Physically abused: Not on file     Forced sexual activity: Not on file   Other Topics Concern    Not on file   Social History Narrative    Not on file     Family History   Problem Relation Age of Onset    Cancer Mother     Heart Disease Father     Kidney Disease Father     Diabetes Father     No Known Problems Brother     Heart Attack Neg Hx     Stroke Neg Hx      Allergies   Allergen Reactions    Morphine Itching    Naproxen Itching     Swelling, hives, difficulty breathing     Past Medical History:   Diagnosis Date    Arthritis     Depressed 10/1/2015    Depression     Hypothyroidism     Incontinence     Myofascial muscle pain 10/1/2015    Narcolepsy     Psychiatric disorder     manic depression    Sleep apnea     Unspecified sleep apnea     uses cpap machine     Past Surgical History:   Procedure Laterality Date    HX BLADDER SUSPENSION      HX CHOLECYSTECTOMY      HX HYSTERECTOMY      HX LUMBAR FUSION  2000    L4-L5-S1 decompression and fusion, Dr. Rachel Preciado  2010    removal of segmental instrumentation, Dr. Joanna Mccarty  2014    and removed bursitis from right shoulder    HX ORTHOPAEDIC      Right Foot surgery    HX SHOULDER ARTHROSCOPY Right      Current Outpatient Medications on File Prior to Visit   Medication Sig    FLUoxetine (PROZAC) 40 mg capsule Take 40 mg by mouth daily.  nabumetone (RELAFEN) 750 mg tablet Take 750 mg by mouth daily.  cholecalciferol, VITAMIN D3, (VITAMIN D3) 5,000 unit tab tablet Take 5,000 Units by mouth daily.  OTHER Take 1 Cap by mouth daily. EB-N3.  Pt takes one capsule daily for her neuropathy    furosemide (LASIX) 20 mg tablet TAKE 1 TABLET BY MOUTH TWICE DAILY AS NEEDED    levothyroxine (SYNTHROID) 137 mcg tablet Take 0.125 mcg by mouth Daily (before breakfast).  liothyronine (CYTOMEL) 5 mcg tablet TK 1 T PO D    ALPRAZolam (XANAX) 0.5 mg tablet TK 1 T PO ONCE A DAY PRN FOR ANXIETY.  DULoxetine (CYMBALTA) 60 mg capsule Take 60 mg by mouth daily.  cyclobenzaprine (FLEXERIL) 10 mg tablet TAKE 1 TABLET BY MOUTH DAILY AS NEEDED FOR MUSCLE SPASMS     No current facility-administered medications on file prior to visit.

## 2019-09-13 ENCOUNTER — TELEPHONE (OUTPATIENT)
Dept: ORTHOPEDIC SURGERY | Age: 54
End: 2019-09-13

## 2019-09-13 ENCOUNTER — OFFICE VISIT (OUTPATIENT)
Dept: ORTHOPEDIC SURGERY | Age: 54
End: 2019-09-13

## 2019-09-13 VITALS
WEIGHT: 213.2 LBS | DIASTOLIC BLOOD PRESSURE: 83 MMHG | HEIGHT: 65 IN | TEMPERATURE: 98 F | OXYGEN SATURATION: 100 % | RESPIRATION RATE: 16 BRPM | BODY MASS INDEX: 35.52 KG/M2 | HEART RATE: 77 BPM | SYSTOLIC BLOOD PRESSURE: 139 MMHG

## 2019-09-13 DIAGNOSIS — M47.816 LUMBAR FACET ARTHROPATHY: Primary | ICD-10-CM

## 2019-09-13 DIAGNOSIS — G89.29 CHRONIC BILATERAL LOW BACK PAIN WITHOUT SCIATICA: ICD-10-CM

## 2019-09-13 DIAGNOSIS — M54.50 CHRONIC BILATERAL LOW BACK PAIN WITHOUT SCIATICA: ICD-10-CM

## 2019-09-13 DIAGNOSIS — G89.29 OTHER CHRONIC PAIN: ICD-10-CM

## 2019-09-13 DIAGNOSIS — M62.830 MUSCLE SPASM OF BACK: ICD-10-CM

## 2019-09-13 RX ORDER — CYCLOBENZAPRINE HCL 10 MG
TABLET ORAL
Qty: 30 TAB | Refills: 2 | Status: SHIPPED | OUTPATIENT
Start: 2019-09-13 | End: 2022-08-17

## 2019-09-13 NOTE — TELEPHONE ENCOUNTER
Patient had came back up stairs after her visit because she called PM before leaving back home [facility]) the office stated that they needed her last five office notes and the reason why she needs to go there. Patient would like them to be faxed over to them. Fax # is [phone]. Patient has already filled out a Rel of medical records and scanned into CC. If any further questions please advise patient at [phone].

## 2019-09-13 NOTE — PROGRESS NOTES
[address]  [address], [address]  [place], [address] [place] [address] Street  Phone: [phone]  Fax: [phone]    Arlina Seat  : 1965  PCP: Viola Hayden MD    PROGRESS NOTE    HISTORY OF PRESENT ILLNESS:  Chief Complaint   Patient presents with    Back Pain     FU     Edy Molina is a 53 y.o.  female with history of chronic lumbar pain. She was last seen in 2017 with Dr. Alma Davis. She was referred to Dr. Florencio Harper for SI RFA, prescribed Relafen and lyrica. She experienced minimal relief mason trying a Medrol Dosepak. Pt has a history of renal stones and has tried Topamax in the past. She also experienced blisters in the mouth when trying Neurontin. Of note, patient is allergic to naproxen but she is able to tolerate other antiinflammatories. Today, she states she is here for pain, lots of pain. She states she would like chronic pain medication. She states she has tried PT, SI, and gabapentin/ Topamax, lyrica in the past with no relief. Denies bladder/bowel dysfunction, saddle paresthesia, weakness, gait disturbance, or other neurological deficit. Pt at this time desires to continue with current care/proceed with medication evaluation. Lumbar spine 4V x-rays from 2017   1) Dextroscoliosis . 2) Significant bony hypertrophy at L5-S1 bilaterally. 3) Anterior fusion L5-S1. 4) Mild calcification in the aorta. 5) Disc replacement at L5-S1. 6) Significant facet arthropathy at L4-5.   7) Mild degenerative discs at L1 and L3.  8) No instability.      Knee x-rays from 2017   1) Normal joint space bilaterally. 2) No significant degenerative changes in the left knee.      Cervical spine MRI from 2013          Impression Impression: Minimal degenerative disc disease, most prominent at C4-C5 as  described.             Lumbar spine MRI from 2012          Impression Impression: Postoperative change as above.  No central stenosis or foraminal  stenosis. No disc herniation. Facet inflammation at L5- S1.        ASSESSMENT  53 y.o. female with back pain. Diagnoses and all orders for this visit:    1. Lumbar facet arthropathy  -     cyclobenzaprine (FLEXERIL) 10 mg tablet; TAKE 1 TABLET BY MOUTH DAILY AS NEEDED FOR MUSCLE SPASMS  -     REFERRAL TO PAIN MANAGEMENT    2. Muscle spasm of back  -     cyclobenzaprine (FLEXERIL) 10 mg tablet; TAKE 1 TABLET BY MOUTH DAILY AS NEEDED FOR MUSCLE SPASMS  -     REFERRAL TO PAIN MANAGEMENT    3. Other chronic pain  -     cyclobenzaprine (FLEXERIL) 10 mg tablet; TAKE 1 TABLET BY MOUTH DAILY AS NEEDED FOR MUSCLE SPASMS  -     REFERRAL TO PAIN MANAGEMENT    4. Chronic bilateral low back pain without sciatica  -     cyclobenzaprine (FLEXERIL) 10 mg tablet; TAKE 1 TABLET BY MOUTH DAILY AS NEEDED FOR MUSCLE SPASMS  -     REFERRAL TO PAIN MANAGEMENT         IMPRESSION/PLAN    1) Pt was given information on back exercises. 2) referral to PM. Declined PT, SI, anti-neuritics. Asking for pain medication. 3) rx of flexeril for muscle spasms. 4) Ms. Mikey Kendrick has a reminder for a \"due or due soon\" health maintenance. I have asked that she contact her primary care provider, Janna Elise MD, for follow-up on this health maintenance. 5) We have informed patient to notify us for immediate appointment if he has any worsening neurogical symptoms or if an emergency situation presents, then call 911  6) Pt will follow-up in  PRN. Risks and benefits of ongoing therapy have been reviewed with the patient.  is appropriate.        PAST MEDICAL HISTORY  Past Medical History:   Diagnosis Date    Arthritis     Depressed 10/1/2015    Depression     Hypothyroidism     Incontinence     Myofascial muscle pain 10/1/2015    Narcolepsy     Psychiatric disorder     manic depression    Sleep apnea     Unspecified sleep apnea     uses cpap machine        MEDICATIONS  Current Outpatient Medications   Medication Sig Dispense Refill    cyclobenzaprine (FLEXERIL) 10 mg tablet TAKE 1 TABLET BY MOUTH DAILY AS NEEDED FOR MUSCLE SPASMS 30 Tab 2    FLUoxetine (PROZAC) 40 mg capsule Take 40 mg by mouth daily.  nabumetone (RELAFEN) 750 mg tablet Take 750 mg by mouth daily.  cholecalciferol, VITAMIN D3, (VITAMIN D3) 5,000 unit tab tablet Take 5,000 Units by mouth daily.  OTHER Take 1 Cap by mouth daily. EB-N3. Pt takes one capsule daily for her neuropathy      furosemide (LASIX) 20 mg tablet TAKE 1 TABLET BY MOUTH TWICE DAILY AS NEEDED 45 Tab 0    levothyroxine (SYNTHROID) 137 mcg tablet Take 0.125 mcg by mouth Daily (before breakfast).  DULoxetine (CYMBALTA) 60 mg capsule Take 60 mg by mouth daily.  liothyronine (CYTOMEL) 5 mcg tablet TK 1 T PO D  4    ALPRAZolam (XANAX) 0.5 mg tablet TK 1 T PO ONCE A DAY PRN FOR ANXIETY. 1       ALLERGIES  Allergies   Allergen Reactions    Morphine Itching    Naproxen Itching     Swelling, hives, difficulty breathing       SOCIAL HISTORY    Social History     Socioeconomic History    Marital status: LEGALLY      Spouse name: Not on file    Number of children: Not on file    Years of education: Not on file    Highest education level: Not on file   Occupational History    Not on file   Social Needs    Financial resource strain: Not on file    Food insecurity:     Worry: Not on file     Inability: Not on file    Transportation needs:     Medical: Not on file     Non-medical: Not on file   Tobacco Use    Smoking status: Former Smoker     Packs/day: 0.50     Last attempt to quit: 2017     Years since quittin.0    Smokeless tobacco: Never Used   Substance and Sexual Activity    Alcohol use:  Yes     Alcohol/week: 2.0 standard drinks     Types: 2 Shots of liquor per week     Comment: weekends and few times per week    Drug use: No    Sexual activity: Yes     Birth control/protection: Surgical     Comment: Hysterectomy   Lifestyle    Physical activity: Days per week: Not on file     Minutes per session: Not on file    Stress: Not on file   Relationships    Social connections:     Talks on phone: Not on file     Gets together: Not on file     Attends Hinduism service: Not on file     Active member of club or organization: Not on file     Attends meetings of clubs or organizations: Not on file     Relationship status: Not on file    Intimate partner violence:     Fear of current or ex partner: Not on file     Emotionally abused: Not on file     Physically abused: Not on file     Forced sexual activity: Not on file   Other Topics Concern     Service Not Asked    Blood Transfusions Not Asked    Caffeine Concern Not Asked    Occupational Exposure Not Asked   Nathan Guile Hazards Not Asked    Sleep Concern Not Asked    Stress Concern Not Asked    Weight Concern Not Asked    Special Diet Not Asked    Back Care Not Asked    Exercise Not Asked    Bike Helmet Not Asked    Seat Belt Not Asked    Self-Exams Not Asked   Social History Narrative    Not on file       SUBJECTIVE      Pain Scale: 8/10    Pain Assessment  9/13/2019   Location of Pain Back   Pain Location Comment -   Location Modifiers -   Severity of Pain 8   Quality of Pain Aching;Dull   Quality of Pain Comment -   Duration of Pain Persistent   Frequency of Pain Constant   Aggravating Factors (No Data)   Aggravating Factors Comment pain is just there   Limiting Behavior -   Relieving Factors Nothing   Relieving Factors Comment -   Result of Injury -       Accompanied by self. REVIEW OF SYSTEMS  ROS    Constitutional: Negative for fever, chills, or weight change. Respiratory: Negative for cough or shortness of breath. Cardiovascular: Negative for chest pain or palpitations. Gastrointestinal: Negative for acid reflux, change in bowel habits, or constipation. Genitourinary: Negative for incontinence, dysuria and flank pain. Musculoskeletal: Positive for lumbar pain.   Skin: Negative for rash. Neurological: Negative for headaches, dizziness, or numbness. Endo/Heme/Allergies: Negative . Psychiatric/Behavioral: Negative. PHYSICAL EXAMINATION  Visit Vitals  /83 (BP 1 Location: Left arm, BP Patient Position: Sitting)   Pulse 77   Temp 98 °F (36.7 °C) (Oral)   Resp 16   Ht 5' 5\" (1.651 m)   Wt 213 lb 3.2 oz (96.7 kg)   SpO2 100%   BMI 35.48 kg/m²       Constitutional: Well developed,  well nourished,  awake, alert, and in no acute distress. Neurological:  Sensation to light touch is intact. Psychiatric: Affect and mood are appropriate. Integumentary: No rashes or abrasions noted on exposed areas,  warm, dry and intact. Cardiovascular/Peripheral Vascular:  No peripheral edema is noted. Lymphatic:  No evidence of lymphedema. No cervical lymphadenopathy. SPINE/MUSCULOSKELETAL EXAM    Lumbar spine:  No rash, ecchymosis, or gross obliquity. No fasciculations. No focal atrophy is noted. Range of motion is intact. Tenderness to palpation to lumbar spine. SI joints non-tender. Trochanters non tender. Musculoskeletal:  No pain with extension, axial loading, or forward flexion. No pain with internal or external rotation of her hips. MOTOR     Hip Flex  Quads Hamstrings Ankle DF EHL Ankle PF   Right +4/5 +4/5 +4/5 +4/5 +4/5 +4/5   Left +4/5 +4/5 +4/5 +4/5 +4/5 +4/5       Straight Leg raise - bilaterally. normal gait and station    Ambulation without assistive device. full weight bearing, non-antalgic gait.     Laura Briones NP

## 2019-09-13 NOTE — PROGRESS NOTES
Tamar Seaman presents today for   Chief Complaint   Patient presents with    Back Pain     FU       Is someone accompanying this pt? NO    Is the patient using any DME equipment during OV? NO    Depression Screening:  3 most recent PHQ Screens 5/2/2019   Little interest or pleasure in doing things Nearly every day   Feeling down, depressed, irritable, or hopeless Nearly every day   Total Score PHQ 2 6   Trouble falling or staying asleep, or sleeping too much Nearly every day   Feeling tired or having little energy Nearly every day   Poor appetite, weight loss, or overeating Not at all   Feeling bad about yourself - or that you are a failure or have let yourself or your family down Nearly every day   Trouble concentrating on things such as school, work, reading, or watching TV Not at all   Moving or speaking so slowly that other people could have noticed; or the opposite being so fidgety that others notice Not at all   Thoughts of being better off dead, or hurting yourself in some way Not at all   PHQ 9 Score 15   How difficult have these problems made it for you to do your work, take care of your home and get along with others Very difficult       Learning Assessment:  Learning Assessment 10/29/2018   PRIMARY LEARNER Patient   HIGHEST LEVEL OF EDUCATION - PRIMARY LEARNER  -   CO-LEARNER CAREGIVER -   PRIMARY LANGUAGE ENGLISH   LEARNER PREFERENCE PRIMARY LISTENING   ANSWERED BY patient   RELATIONSHIP SELF       Abuse Screening:  Abuse Screening Questionnaire 5/2/2019   Do you ever feel afraid of your partner? N   Are you in a relationship with someone who physically or mentally threatens you? N   Is it safe for you to go home? Y       Fall Risk  Fall Risk Assessment, last 12 mths 5/2/2019   Able to walk? Yes   Fall in past 12 months? No   Fall with injury? -   Number of falls in past 12 months -   Fall Risk Score -         Coordination of Care:  1.  Have you been to the ER, urgent care clinic since your last visit? NO  Hospitalized since your last visit? NO    2. Have you seen or consulted any other health care providers outside of the [address] [place] [address] since your last visit? NO Include any pap smears or colon screening.  NO    Last  Checked 9/13/19

## 2019-09-13 NOTE — PATIENT INSTRUCTIONS
Back Pain: Care Instructions  Your Care Instructions    Back pain has many possible causes. It is often related to problems with muscles and ligaments of the back. It may also be related to problems with the nerves, discs, or bones of the back. Moving, lifting, standing, sitting, or sleeping in an awkward way can strain the back. Sometimes you don't notice the injury until later. Arthritis is another common cause of back pain. Although it may hurt a lot, back pain usually improves on its own within several weeks. Most people recover in 12 weeks or less. Using good home treatment and being careful not to stress your back can help you feel better sooner. Follow-up care is a key part of your treatment and safety. Be sure to make and go to all appointments, and call your doctor if you are having problems. It's also a good idea to know your test results and keep a list of the medicines you take. How can you care for yourself at home? · Sit or lie in positions that are most comfortable and reduce your pain. Try one of these positions when you lie down:  ? Lie on your back with your knees bent and supported by large pillows. ? Lie on the floor with your legs on the seat of a sofa or chair. ? Lie on your side with your knees and hips bent and a pillow between your legs. ? Lie on your stomach if it does not make pain worse. · Do not sit up in bed, and avoid soft couches and twisted positions. Bed rest can help relieve pain at first, but it delays healing. Avoid bed rest after the first day of back pain. · Change positions every 30 minutes. If you must sit for long periods of time, take breaks from sitting. Get up and walk around, or lie in a comfortable position. · Try using a heating pad on a low or medium setting for 15 to 20 minutes every 2 or 3 hours. Try a warm shower in place of one session with the heating pad. · You can also try an ice pack for 10 to 15 minutes every 2 to 3 hours.  Put a thin cloth between the ice pack and your skin. · Take pain medicines exactly as directed. ? If the doctor gave you a prescription medicine for pain, take it as prescribed. ? If you are not taking a prescription pain medicine, ask your doctor if you can take an over-the-counter medicine. · Take short walks several times a day. You can start with 5 to 10 minutes, 3 or 4 times a day, and work up to longer walks. Walk on level surfaces and avoid hills and stairs until your back is better. · Return to work and other activities as soon as you can. Continued rest without activity is usually not good for your back. · To prevent future back pain, do exercises to stretch and strengthen your back and stomach. Learn how to use good posture, safe lifting techniques, and proper body mechanics. When should you call for help? Call your doctor now or seek immediate medical care if:    · You have new or worsening numbness in your legs.     · You have new or worsening weakness in your legs. (This could make it hard to stand up.)     · You lose control of your bladder or bowels.    Watch closely for changes in your health, and be sure to contact your doctor if:    · You have a fever, lose weight, or don't feel well.     · You do not get better as expected. Where can you learn more? Go to http://garry-neyda.info/. Enter Y327 in the search box to learn more about \"Back Pain: Care Instructions. \"  Current as of: September 20, 2018  Content Version: 12.1  © 5840-7623 [employer]. Care instructions adapted under license by [email or web address] (which disclaims liability or warranty for this information). If you have questions about a medical condition or this instruction, always ask your healthcare professional. [place] [address] any warranty or liability for your use of this information.          Chronic Pain: Care Instructions  Your Care Instructions    Chronic pain is pain that lasts a long time (months or even years) and may or may not have a clear cause. It is different from acute pain, which usually does have a clear cause--like an injury or illness--and gets better over time. Chronic pain:  · Lasts over time but may vary from day to day. · Does not go away despite efforts to end it. · May disrupt your sleep and lead to fatigue. · May cause depression or anxiety. · May make your muscles tense, causing more pain. · Can disrupt your work, hobbies, home life, and relationships with friends and family. Chronic pain is a very real condition. It is not just in your head. Treatment can help and usually includes several methods used together, such as medicines, physical therapy, exercise, and other treatments. Learning how to relax and changing negative thought patterns can also help you cope. Chronic pain is complex. Taking an active role in your treatment will help you better manage your pain. Tell your doctor if you have trouble dealing with your pain. You may have to try several things before you find what works best for you. Follow-up care is a key part of your treatment and safety. Be sure to make and go to all appointments, and call your doctor if you are having problems. It's also a good idea to know your test results and keep a list of the medicines you take. How can you care for yourself at home? · Pace yourself. Break up large jobs into smaller tasks. Save harder tasks for days when you have less pain, or go back and forth between hard tasks and easier ones. Take rest breaks. · Relax, and reduce stress. Relaxation techniques such as deep breathing or meditation can help. · Keep moving. Gentle, daily exercise can help reduce pain over the long run. Try low- or no-impact exercises such as walking, swimming, and stationary biking. Do stretches to stay flexible. · Try heat, cold packs, and massage. · Get enough sleep. Chronic pain can make you tired and drain your energy.  Talk with your doctor if you have trouble sleeping because of pain. · Think positive. Your thoughts can affect your pain level. Do things that you enjoy to distract yourself when you have pain instead of focusing on the pain. See a movie, read a book, listen to music, or spend time with a friend. · If you think you are depressed, talk to your doctor about treatment. · Keep a daily pain diary. Record how your moods, thoughts, sleep patterns, activities, and medicine affect your pain. You may find that your pain is worse during or after certain activities or when you are feeling a certain emotion. Having a record of your pain can help you and your doctor find the best ways to treat your pain. · Take pain medicines exactly as directed. ? If the doctor gave you a prescription medicine for pain, take it as prescribed. ? If you are not taking a prescription pain medicine, ask your doctor if you can take an over-the-counter medicine. Reducing constipation caused by pain medicine  · Include fruits, vegetables, beans, and whole grains in your diet each day. These foods are high in fiber. · Drink plenty of fluids, enough so that your urine is light yellow or clear like water. If you have kidney, heart, or liver disease and have to limit fluids, talk with your doctor before you increase the amount of fluids you drink. · If your doctor recommends it, get more exercise. Walking is a good choice. Bit by bit, increase the amount you walk every day. Try for at least 30 minutes on most days of the week. · Schedule time each day for a bowel movement. A daily routine may help. Take your time and do not strain when having a bowel movement. When should you call for help? Call your doctor now or seek immediate medical care if:    · Your pain gets worse or is out of control.     · You feel down or blue, or you do not enjoy things like you once did. You may be depressed, which is common in people with chronic pain.  Depression can be treated.     · You have vomiting or cramps for more than 2 hours.    Watch closely for changes in your health, and be sure to contact your doctor if:    · You cannot sleep because of pain.     · You are very worried or anxious about your pain.     · You have trouble taking your pain medicine.     · You have any concerns about your pain medicine.     · You have trouble with bowel movements, such as:  ? No bowel movement in 3 days. ? Blood in the anal area, in your stool, or on the toilet paper. ? Diarrhea for more than 24 hours. Where can you learn more? Go to http://garry-neyda.info/. Enter N004 in the search box to learn more about \"Chronic Pain: Care Instructions. \"  Current as of: March 28, 2019  Content Version: 12.1  © 9585-9162 [employer]. Care instructions adapted under license by [employer] (which disclaims liability or warranty for this information). If you have questions about a medical condition or this instruction, always ask your healthcare professional. [place] [address] any warranty or liability for your use of this information. Low Back Arthritis: Exercises  Introduction  Here are some examples of typical rehabilitation exercises for your condition. Start each exercise slowly. Ease off the exercise if you start to have pain. Your doctor or physical therapist will tell you when you can start these exercises and which ones will work best for you. When you are not being active, find a comfortable position for rest. Some people are comfortable on the floor or a medium-firm bed with a small pillow under their head and another under their knees. Some people prefer to lie on their side with a pillow between their knees. Don't stay in one position for too long. Take short walks (10 to 20 minutes) every 2 to 3 hours. Avoid slopes, hills, and stairs until you feel better.  Walk only distances you can manage without pain, especially leg pain. How to do the exercises  Pelvic tilt    1. Lie on your back with your knees bent. 2. \"Brace\" your stomach--tighten your muscles by pulling in and imagining your belly button moving toward your spine. 3. Press your lower back into the floor. You should feel your hips and pelvis rock back. 4. Hold for 6 seconds while breathing smoothly. 5. Relax and allow your pelvis and hips to rock forward. 6. Repeat 8 to 12 times. Back stretches    1. Get down on your hands and knees on the floor. 2. Relax your head and allow it to droop. Round your back up toward the ceiling until you feel a nice stretch in your upper, middle, and lower back. Hold this stretch for as long as it feels comfortable, or about 15 to 30 seconds. 3. Return to the starting position with a flat back while you are on your hands and knees. 4. Let your back sway by pressing your stomach toward the floor. Lift your buttocks toward the ceiling. 5. Hold this position for 15 to 30 seconds. 6. Repeat 2 to 4 times. Follow-up care is a key part of your treatment and safety. Be sure to make and go to all appointments, and call your doctor if you are having problems. It's also a good idea to know your test results and keep a list of the medicines you take. Where can you learn more? Go to http://garry-neyda.info/. Enter Q498 in the search box to learn more about \"Low Back Arthritis: Exercises. \"  Current as of: September 20, 2018  Content Version: 12.1  © 1618-8548 [employer]. Care instructions adapted under license by [employer] (which disclaims liability or warranty for this information). If you have questions about a medical condition or this instruction, always ask your healthcare professional. [address] any warranty or liability for your use of this information.

## 2019-09-16 NOTE — TELEPHONE ENCOUNTER
Referral processed to [facility] [place] per patient's request

## 2020-01-17 RX ORDER — NABUMETONE 750 MG/1
TABLET, FILM COATED ORAL
Qty: 180 TAB | OUTPATIENT
Start: 2020-01-17

## 2022-08-17 RX ORDER — METOPROLOL SUCCINATE 25 MG/1
25 TABLET, EXTENDED RELEASE ORAL DAILY
COMMUNITY

## 2022-08-17 RX ORDER — ESTRADIOL 0.5 MG/1
0.5 TABLET ORAL DAILY
COMMUNITY

## 2022-08-17 RX ORDER — METHOCARBAMOL 750 MG/1
750 TABLET, FILM COATED ORAL 3 TIMES DAILY
COMMUNITY

## 2022-08-22 ENCOUNTER — ANESTHESIA EVENT (OUTPATIENT)
Dept: SURGERY | Age: 57
End: 2022-08-22
Payer: MEDICARE

## 2022-08-22 RX ORDER — SODIUM CHLORIDE 0.9 % (FLUSH) 0.9 %
5-40 SYRINGE (ML) INJECTION EVERY 8 HOURS
Status: CANCELLED | OUTPATIENT
Start: 2022-08-22

## 2022-08-22 RX ORDER — INSULIN LISPRO 100 [IU]/ML
INJECTION, SOLUTION INTRAVENOUS; SUBCUTANEOUS ONCE
Status: CANCELLED | OUTPATIENT
Start: 2022-08-22 | End: 2022-08-22

## 2022-08-22 RX ORDER — SODIUM CHLORIDE 0.9 % (FLUSH) 0.9 %
5-40 SYRINGE (ML) INJECTION AS NEEDED
Status: CANCELLED | OUTPATIENT
Start: 2022-08-22

## 2022-08-30 ENCOUNTER — HOSPITAL ENCOUNTER (OUTPATIENT)
Age: 57
Setting detail: OUTPATIENT SURGERY
Discharge: HOME | End: 2022-08-30
Attending: SURGERY | Admitting: SURGERY
Payer: MEDICARE

## 2022-08-30 ENCOUNTER — ANESTHESIA (OUTPATIENT)
Dept: SURGERY | Age: 57
End: 2022-08-30
Payer: MEDICARE

## 2022-08-30 VITALS
TEMPERATURE: 97.6 F | OXYGEN SATURATION: 98 % | WEIGHT: 230 LBS | RESPIRATION RATE: 14 BRPM | BODY MASS INDEX: 38.32 KG/M2 | HEIGHT: 65 IN | HEART RATE: 72 BPM | SYSTOLIC BLOOD PRESSURE: 138 MMHG | DIASTOLIC BLOOD PRESSURE: 76 MMHG

## 2022-08-30 LAB
COVID-19 RAPID TEST, COVR: NOT DETECTED
SARS-COV-2, COV2: NORMAL
SARS-COV-2, COV2: NORMAL
SARS-COV-2, NAA: NOT DETECTED

## 2022-08-30 PROCEDURE — 77030011267 HC ELECTRD BLD COVD -A: Performed by: SURGERY

## 2022-08-30 PROCEDURE — 77030012406 HC DRN WND PENRS BARD -A: Performed by: SURGERY

## 2022-08-30 PROCEDURE — 77030002986 HC SUT PROL J&J -A: Performed by: SURGERY

## 2022-08-30 PROCEDURE — 76210000063 HC OR PH I REC FIRST 0.5 HR: Performed by: SURGERY

## 2022-08-30 PROCEDURE — 74011000272 HC RX REV CODE- 272: Performed by: SURGERY

## 2022-08-30 PROCEDURE — 76060000032 HC ANESTHESIA 0.5 TO 1 HR: Performed by: SURGERY

## 2022-08-30 PROCEDURE — U0003 INFECTIOUS AGENT DETECTION BY NUCLEIC ACID (DNA OR RNA); SEVERE ACUTE RESPIRATORY SYNDROME CORONAVIRUS 2 (SARS-COV-2) (CORONAVIRUS DISEASE [COVID-19]), AMPLIFIED PROBE TECHNIQUE, MAKING USE OF HIGH THROUGHPUT TECHNOLOGIES AS DESCRIBED BY CMS-2020-01-R: HCPCS

## 2022-08-30 PROCEDURE — 77030013079 HC BLNKT BAIR HGGR 3M -A: Performed by: NURSE ANESTHETIST, CERTIFIED REGISTERED

## 2022-08-30 PROCEDURE — 77030041614 HC WRP CLD THER BREG -B: Performed by: SURGERY

## 2022-08-30 PROCEDURE — 77030018673: Performed by: SURGERY

## 2022-08-30 PROCEDURE — 88302 TISSUE EXAM BY PATHOLOGIST: CPT

## 2022-08-30 PROCEDURE — 77030040361 HC SLV COMPR DVT MDII -B: Performed by: SURGERY

## 2022-08-30 PROCEDURE — 87635 SARS-COV-2 COVID-19 AMP PRB: CPT

## 2022-08-30 PROCEDURE — 74011250636 HC RX REV CODE- 250/636: Performed by: SURGERY

## 2022-08-30 PROCEDURE — 74011000250 HC RX REV CODE- 250: Performed by: SURGERY

## 2022-08-30 PROCEDURE — 74011000258 HC RX REV CODE- 258: Performed by: SURGERY

## 2022-08-30 PROCEDURE — 77030008463 HC STPLR SKN PROX J&J -B: Performed by: SURGERY

## 2022-08-30 PROCEDURE — 74011000250 HC RX REV CODE- 250: Performed by: NURSE ANESTHETIST, CERTIFIED REGISTERED

## 2022-08-30 PROCEDURE — 77030026438 HC STYL ET INTUB CARD -A: Performed by: NURSE ANESTHETIST, CERTIFIED REGISTERED

## 2022-08-30 PROCEDURE — 2709999900 HC NON-CHARGEABLE SUPPLY: Performed by: SURGERY

## 2022-08-30 PROCEDURE — 77030008684 HC TU ET CUF COVD -B: Performed by: NURSE ANESTHETIST, CERTIFIED REGISTERED

## 2022-08-30 PROCEDURE — 76210000020 HC REC RM PH II FIRST 0.5 HR: Performed by: SURGERY

## 2022-08-30 PROCEDURE — 74011250636 HC RX REV CODE- 250/636: Performed by: NURSE ANESTHETIST, CERTIFIED REGISTERED

## 2022-08-30 PROCEDURE — 76010000138 HC OR TIME 0.5 TO 1 HR: Performed by: SURGERY

## 2022-08-30 PROCEDURE — 77030002933 HC SUT MCRYL J&J -A: Performed by: SURGERY

## 2022-08-30 RX ORDER — FLUMAZENIL 0.1 MG/ML
0.2 INJECTION INTRAVENOUS
Status: DISCONTINUED | OUTPATIENT
Start: 2022-08-30 | End: 2022-08-30 | Stop reason: HOSPADM

## 2022-08-30 RX ORDER — ONDANSETRON 2 MG/ML
4 INJECTION INTRAMUSCULAR; INTRAVENOUS ONCE
Status: DISCONTINUED | OUTPATIENT
Start: 2022-08-30 | End: 2022-08-30 | Stop reason: HOSPADM

## 2022-08-30 RX ORDER — FENTANYL CITRATE 50 UG/ML
50 INJECTION, SOLUTION INTRAMUSCULAR; INTRAVENOUS AS NEEDED
Status: DISCONTINUED | OUTPATIENT
Start: 2022-08-30 | End: 2022-08-30 | Stop reason: HOSPADM

## 2022-08-30 RX ORDER — NALOXONE HYDROCHLORIDE 0.4 MG/ML
0.2 INJECTION, SOLUTION INTRAMUSCULAR; INTRAVENOUS; SUBCUTANEOUS AS NEEDED
Status: DISCONTINUED | OUTPATIENT
Start: 2022-08-30 | End: 2022-08-30 | Stop reason: HOSPADM

## 2022-08-30 RX ORDER — MAGNESIUM SULFATE 100 %
4 CRYSTALS MISCELLANEOUS AS NEEDED
Status: CANCELLED | OUTPATIENT
Start: 2022-08-30

## 2022-08-30 RX ORDER — SODIUM CHLORIDE 0.9 % (FLUSH) 0.9 %
5-40 SYRINGE (ML) INJECTION EVERY 8 HOURS
Status: DISCONTINUED | OUTPATIENT
Start: 2022-08-30 | End: 2022-08-30 | Stop reason: HOSPADM

## 2022-08-30 RX ORDER — SODIUM CHLORIDE, SODIUM LACTATE, POTASSIUM CHLORIDE, CALCIUM CHLORIDE 600; 310; 30; 20 MG/100ML; MG/100ML; MG/100ML; MG/100ML
25 INJECTION, SOLUTION INTRAVENOUS CONTINUOUS
Status: DISCONTINUED | OUTPATIENT
Start: 2022-08-30 | End: 2022-08-30 | Stop reason: HOSPADM

## 2022-08-30 RX ORDER — DEXTROSE 50 % IN WATER (D50W) INTRAVENOUS SYRINGE
25-50 AS NEEDED
Status: CANCELLED | OUTPATIENT
Start: 2022-08-30

## 2022-08-30 RX ORDER — ALBUTEROL SULFATE 0.83 MG/ML
2.5 SOLUTION RESPIRATORY (INHALATION)
Status: DISCONTINUED | OUTPATIENT
Start: 2022-08-30 | End: 2022-08-30 | Stop reason: HOSPADM

## 2022-08-30 RX ORDER — ONDANSETRON 2 MG/ML
INJECTION INTRAMUSCULAR; INTRAVENOUS AS NEEDED
Status: DISCONTINUED | OUTPATIENT
Start: 2022-08-30 | End: 2022-08-30 | Stop reason: HOSPADM

## 2022-08-30 RX ORDER — PROPOFOL 10 MG/ML
INJECTION, EMULSION INTRAVENOUS AS NEEDED
Status: DISCONTINUED | OUTPATIENT
Start: 2022-08-30 | End: 2022-08-30 | Stop reason: HOSPADM

## 2022-08-30 RX ORDER — ROCURONIUM BROMIDE 10 MG/ML
INJECTION, SOLUTION INTRAVENOUS AS NEEDED
Status: DISCONTINUED | OUTPATIENT
Start: 2022-08-30 | End: 2022-08-30 | Stop reason: HOSPADM

## 2022-08-30 RX ORDER — MIDAZOLAM HYDROCHLORIDE 1 MG/ML
INJECTION, SOLUTION INTRAMUSCULAR; INTRAVENOUS AS NEEDED
Status: DISCONTINUED | OUTPATIENT
Start: 2022-08-30 | End: 2022-08-30 | Stop reason: HOSPADM

## 2022-08-30 RX ORDER — LIDOCAINE HYDROCHLORIDE AND EPINEPHRINE 10; 10 MG/ML; UG/ML
INJECTION, SOLUTION INFILTRATION; PERINEURAL AS NEEDED
Status: DISCONTINUED | OUTPATIENT
Start: 2022-08-30 | End: 2022-08-30 | Stop reason: HOSPADM

## 2022-08-30 RX ORDER — SODIUM CHLORIDE 0.9 % (FLUSH) 0.9 %
5-40 SYRINGE (ML) INJECTION AS NEEDED
Status: DISCONTINUED | OUTPATIENT
Start: 2022-08-30 | End: 2022-08-30 | Stop reason: HOSPADM

## 2022-08-30 RX ORDER — FENTANYL CITRATE 50 UG/ML
50 INJECTION, SOLUTION INTRAMUSCULAR; INTRAVENOUS
Status: DISCONTINUED | OUTPATIENT
Start: 2022-08-30 | End: 2022-08-30 | Stop reason: HOSPADM

## 2022-08-30 RX ORDER — FENTANYL CITRATE 50 UG/ML
INJECTION, SOLUTION INTRAMUSCULAR; INTRAVENOUS AS NEEDED
Status: DISCONTINUED | OUTPATIENT
Start: 2022-08-30 | End: 2022-08-30 | Stop reason: HOSPADM

## 2022-08-30 RX ORDER — DIPHENHYDRAMINE HYDROCHLORIDE 50 MG/ML
12.5 INJECTION, SOLUTION INTRAMUSCULAR; INTRAVENOUS
Status: DISCONTINUED | OUTPATIENT
Start: 2022-08-30 | End: 2022-08-30 | Stop reason: HOSPADM

## 2022-08-30 RX ADMIN — SUGAMMADEX 250 MG: 100 INJECTION, SOLUTION INTRAVENOUS at 09:15

## 2022-08-30 RX ADMIN — ONDANSETRON HYDROCHLORIDE 4 MG: 2 INJECTION, SOLUTION INTRAMUSCULAR; INTRAVENOUS at 08:42

## 2022-08-30 RX ADMIN — CEFAZOLIN 2 G: 2 INJECTION, POWDER, FOR SOLUTION INTRAMUSCULAR; INTRAVENOUS at 08:42

## 2022-08-30 RX ADMIN — ROCURONIUM BROMIDE 40 MG: 50 INJECTION, SOLUTION INTRAVENOUS at 08:42

## 2022-08-30 RX ADMIN — ROCURONIUM BROMIDE 10 MG: 50 INJECTION, SOLUTION INTRAVENOUS at 09:03

## 2022-08-30 RX ADMIN — ROCURONIUM BROMIDE 10 MG: 50 INJECTION, SOLUTION INTRAVENOUS at 08:47

## 2022-08-30 RX ADMIN — FENTANYL CITRATE 100 MCG: 50 INJECTION, SOLUTION INTRAMUSCULAR; INTRAVENOUS at 08:42

## 2022-08-30 RX ADMIN — MIDAZOLAM HYDROCHLORIDE 2 MG: 2 INJECTION, SOLUTION INTRAMUSCULAR; INTRAVENOUS at 08:42

## 2022-08-30 RX ADMIN — PROPOFOL 200 MG: 10 INJECTION, EMULSION INTRAVENOUS at 08:44

## 2022-08-30 NOTE — ANESTHESIA POSTPROCEDURE EVALUATION
Procedure(s):  OPEN VENTRAL TROCAR HERNIA REPAIR AND OPEN UMBILICAL HERNIA REPAIR. general    Anesthesia Post Evaluation      Multimodal analgesia: multimodal analgesia used between 6 hours prior to anesthesia start to PACU discharge  Patient location during evaluation: bedside  Patient participation: complete - patient cannot participate  Level of consciousness: awake and alert  Pain management: adequate  Airway patency: patent  Anesthetic complications: no  Cardiovascular status: stable  Respiratory status: acceptable  Hydration status: acceptable  Comments: DC when criteria met.   Post anesthesia nausea and vomiting:  none  Final Post Anesthesia Temperature Assessment:  Normothermia (36.0-37.5 degrees C)      INITIAL Post-op Vital signs:   Vitals Value Taken Time   /79 08/30/22 0927   Temp 36.4 °C (97.5 °F) 08/30/22 0927   Pulse 68 08/30/22 0927   Resp 16 08/30/22 0927   SpO2 95 % 08/30/22 0927

## 2022-08-30 NOTE — DISCHARGE INSTRUCTIONS
Dr. Luisana Qureshi Surgery Discharge Instructions:    1. Keep dressing clean dry and intact for 24 hrs.   2. In 24 hrs, remove dressing and you may shower with soap and water. Pat incision dry. Keep incision clean and dry. 3. Take Hydrocodone/Acetaminophen as needed for pain - per Pain Management. 4. No heavy lifting for 6 weeks to allow healing and prevent recurrence of hernia. 5. No driving for 24 hrs after anesthesia.

## 2022-08-30 NOTE — ADDENDUM NOTE
Addendum  created 08/30/22 0940 by Osmin Lisa CRNA    Clinical Note Signed, Review and Sign - Ready for Procedure

## 2022-08-30 NOTE — OP NOTES
Operative Note    Patient: Mora Wilson  YOB: 1965  MRN: [id number]    Date of Procedure: 8/30/2022     Pre-Op Diagnosis: Recurrent ventral hernia [K43.2]    Post-Op Diagnosis:  1 upper midline trocar site hernia #2 umbilical hernia       Procedure(s):  OPEN VENTRAL TROCAR HERNIA REPAIR AND OPEN UMBILICAL HERNIA REPAIR    Surgeon(s):  Matt Bond MD    Surgical Assistant: Registered Nurse First Assistant: Jana Huerta    Anesthesia: General     Estimated Blood Loss (mL):  Minimal    Complications: None    Specimens: * No specimens in log *     Implants: * No implants in log *    Drains: * No LDAs found *    Findings: Procedurally patient received Ancef 2 g DVT prophylaxis with SCDs. She had a upper midline trocar site hernia approximately 2 cm in diameter and an umbilical hernia approximately 1 cm in diameter. Both repaired primarily with 0 Prolene without tension. Hernia sacs were sent from both for pathologic evaluation. Detailed Description of Procedure:   Patient was consented acknowledge risk and benefits of the procedure was taken to the operating room placed supine position. After induction of adequate general endotracheal anesthesia she is prepped and draped in usual sterile CDC fashion to include the use of Ioban. Local anesthetic was used to anesthetize the upper midline trocar site as well as the right of the umbilical region. 15 blade scalpel was used to make an incision over the upper midline trocar site dissection was carried down with cautery exposing the hernia sac which was excised back to the level of the fascia. Fascial edges were cleared of all fat and subsequently reapproximated using a 0 figure-of-eight Prolene x2 without tension. Wound was irrigated seen to have X hemostasis. Deep subcutaneous tissue as well as deep dermal layers were reapproximated using interrupted #2-0 Monocryl followed by staples.   Needle counts were correct after this portion of the procedure. At this point skin incision made 15 blade scalpel to the right of the umbilicus dissection was carried down with cautery exposing hernia sac which was dissected down to the level of fascia. Fascial defect was reapproximated using figure-of-eight 0 Prolene without tension. Wound was irrigated synovectomy stasis deep subcutaneous tissue and deep dermal layers reapproximated using interrupted 2-0 Monocryl. Skin is reapproximated using staples all lap instrument needle counts were correct sterile dressings were placed over both wounds patient Toller procedure well without immediate complication.     Electronically Signed by Aislinn Hartman MD on 8/30/2022 at 9:16 AM

## 2022-08-30 NOTE — DISCHARGE SUMMARY
Physician Discharge Summary     Patient ID:  Shelby Valderrama  [id number]  64 y.o.  1965    Allergies: Morphine and Naproxen    Admit Date: 8/30/2022    Discharge Date: 8/30/2022    * Admission Diagnoses: Recurrent ventral hernia [K43.2]    * Discharge Diagnoses:    Hospital Problems as of 8/30/2022 Date Reviewed: 8/30/2022   None       Admission Condition: Good    * Discharge Condition: good    * Procedures: Procedure(s):  OPEN VENTRAL TROCAR HERNIA REPAIR AND OPEN UMBILICAL HERNIA REPAIR    * Hospital Course:   Normal hospital course for this procedure. Consults: None    Significant Diagnostic Studies:     * Disposition: Home    Discharge Medications:   Current Discharge Medication List        CONTINUE these medications which have NOT CHANGED    Details   metoprolol succinate (TOPROL-XL) 25 mg XL tablet Take 25 mg by mouth daily. estradioL (ESTRACE) 0.5 mg tablet Take 0.5 mg by mouth daily. methocarbamoL (ROBAXIN) 750 mg tablet Take 750 mg by mouth three (3) times daily. cholecalciferol, VITAMIN D3, (VITAMIN D3) 5,000 unit tab tablet Take 5,000 Units by mouth daily. levothyroxine (SYNTHROID) 137 mcg tablet Take 0.125 mcg by mouth Daily (before breakfast). DULoxetine (CYMBALTA) 60 mg capsule Take 60 mg by mouth daily. liothyronine (CYTOMEL) 5 mcg tablet TK 1 T PO D  Refills: 4      OTHER Take 1 Cap by mouth daily. EB-N3. Pt takes one capsule daily for her neuropathy             * Follow-up Care/Patient Instructions: Activity: Activity as tolerated and no driving for today  Diet: Regular Diet  Wound Care: Keep wound clean and dry    Follow-up Information       Follow up With Specialties Details Why Contact Info    Ochoa Posadas MD Internal Medicine Physician   [phone] Roberto Valenzuela MD Surgery Physician Schedule an appointment as soon as possible for a visit in 3 week(s)  [address] [address] [phone] Follow-up tests/labs     Signed:  Sebastian Barajas MD  8/30/2022  9:20 AM

## 2022-08-30 NOTE — ANESTHESIA PREPROCEDURE EVALUATION
Relevant Problems   NEUROLOGY   (+) Depressed   (+) Recurrent depression (HCC)      ENDOCRINE   (+) Sacroiliitis (HCC)   (+) Severe obesity (BMI 35.0-35.9 with comorbidity) (HCC)       Anesthetic History   No history of anesthetic complications            Review of Systems / Medical History  Patient summary reviewed, nursing notes reviewed and pertinent labs reviewed    Pulmonary        Sleep apnea: BiPAP           Neuro/Psych         Psychiatric history     Cardiovascular  Within defined limits                Exercise tolerance: >4 METS     GI/Hepatic/Renal  Within defined limits              Endo/Other      Hypothyroidism: well controlled  Morbid obesity and arthritis     Other Findings              Physical Exam    Airway  Mallampati: II  TM Distance: 4 - 6 cm  Neck ROM: normal range of motion   Mouth opening: Normal     Cardiovascular  Regular rate and rhythm,  S1 and S2 normal,  no murmur, click, rub, or gallop  Rhythm: regular  Rate: normal         Dental  No notable dental hx       Pulmonary  Breath sounds clear to auscultation               Abdominal  GI exam deferred       Other Findings            Anesthetic Plan    ASA: 3  Anesthesia type: general          Induction: Intravenous  Anesthetic plan and risks discussed with: Patient

## (undated) DEVICE — (D)PREP SKN CHLRAPRP APPL 26ML -- CONVERT TO ITEM 371833

## (undated) DEVICE — AVANOS* SHORT BEVEL NEEDLE: Brand: AVANOS

## (undated) DEVICE — MEDIA CONTRAST 10ML 200MG/ML 41%

## (undated) DEVICE — COUNTER NDL 40 COUNT HLD 70 FOAM BLK ADH W/ MAG

## (undated) DEVICE — (D)SYR 10ML 1/5ML GRAD NSAF -- PKGING CHANGE USE ITEM 338027

## (undated) DEVICE — STAPLER SKIN H3.9MM WIRE DIA0.58MM CRWN 6.9MM 35 STPL ROT

## (undated) DEVICE — DRAPE TWL SURG 16X26IN BLU ORB04] ALLCARE INC]

## (undated) DEVICE — MARKER SKN REG TIP W/RULER -- STRL

## (undated) DEVICE — GLOVE SURG SZ 85 L12IN FNGR THK79MIL GRN LTX FREE

## (undated) DEVICE — GLOVE SURG UNDERGLOVE 7.5 PF BLU BIOGEL PI MIC LF

## (undated) DEVICE — SUT PROL 1 30IN CT1 BLU --

## (undated) DEVICE — NEEDLE HYPO 25GA L1.5IN BLU POLYPR HUB S STL REG BVL STR

## (undated) DEVICE — PACK,UNIVERSAL,SPLIT,II,AURORA: Brand: MEDLINE

## (undated) DEVICE — MIRAGE SWIFT II PILLOW LGE: Brand: MIRAGE SWIFT II

## (undated) DEVICE — SUTURE MCRYL SZ 2-0 L36IN ABSRB UD L36MM CT-1 1/2 CIR Y945H

## (undated) DEVICE — PENCIL SMK EVAC L10FT TBNG NONSTICK ESU BLDE PLUMEPEN ELITE

## (undated) DEVICE — SPONGE GZ 4X4 IN 16-PLY DETECTABLE W/ DMT MSTR TAG

## (undated) DEVICE — SUT PROL 0 30IN CT1 BLU --

## (undated) DEVICE — 3M™ TEGADERM™ HP TRANSPARENT FILM DRESSING FRAME STYLE, 9546HP, 4 IN X 4-1/2 IN (10 CM X 11.5 CM), 50/CT 4CT/CASE: Brand: 3M™ TEGADERM™

## (undated) DEVICE — TRAY MYEL SFTY +

## (undated) DEVICE — COVER,TABLE,HEAVY DUTY,77"X90",STRL: Brand: MEDLINE

## (undated) DEVICE — (D)BNDG ADHESIVE FABRIC 3/4X3 -- DISC BY MFR USE ITEM 357960

## (undated) DEVICE — 3M™ IOBAN™ 2 ANTIMICROBIAL INCISE DRAPE 6650EZ: Brand: IOBAN™ 2

## (undated) DEVICE — SOL IRR NACL 0.9% 500ML POUR --

## (undated) DEVICE — DRAIN WND PENRS RADPQ 0.25X12 --

## (undated) DEVICE — SYRINGE BLB 60 CC TIP PROTECTOR STRL LF

## (undated) DEVICE — GOWN,PRECEPT,XLNG/XXLARGE,STRL: Brand: MEDLINE

## (undated) DEVICE — GARMENT,MEDLINE,DVT,INT,CALF,MED, GEN2: Brand: MEDLINE

## (undated) DEVICE — SPONGE GZ W2XL2IN NONWOVEN 4 PLY FASTER WICKING ABIL AVANT

## (undated) DEVICE — GAUZE,SPONGE,4"X4",16PLY,STRL,LF,10/TRAY: Brand: MEDLINE

## (undated) DEVICE — GLOVE ORANGE PI 8 1/2   MSG9085

## (undated) DEVICE — INSULATED BLADE ELECTRODE: Brand: EDGE

## (undated) DEVICE — SPONGE LAP SOFT 18X18 IN X RAY DETECTABLE

## (undated) DEVICE — DISSECTOR SPNG CHERRY 0.5 IN LAP ABSORBENT HOLDER FOAM STRL

## (undated) DEVICE — REM POLYHESIVE ADULT PATIENT RETURN ELECTRODE: Brand: VALLEYLAB

## (undated) DEVICE — CUFF BLD PRESSURE MONITORING LNG AD 23-33 CM 1 TUBE MY CUF

## (undated) DEVICE — SUTURE MCRYL + SZ 3-0 L27IN ABSRB UD L26MM SH 1/2 CIR MCP416H

## (undated) DEVICE — GLOVE SURG SZ 65 THK91MIL LTX FREE SYN POLYISOPRENE

## (undated) DEVICE — TOWEL,OR,DSP,ST,BLUE,STD,4/PK,20PK/CS: Brand: MEDLINE

## (undated) DEVICE — INTENDED FOR TISSUE SEPARATION, AND OTHER PROCEDURES THAT REQUIRE A SHARP SURGICAL BLADE TO PUNCTURE OR CUT.: Brand: BARD-PARKER SAFETY BLADES SIZE 15, STERILE

## (undated) DEVICE — YANKAUER,BULB TIP,W/O VENT,RIGID,STERILE: Brand: MEDLINE

## (undated) DEVICE — SUTURE PROL SZ 0 L30IN NONABSORBABLE BLU L26MM CT-2 1/2 CIR 8412H

## (undated) DEVICE — TRAY SUPP STD NO DRUG W EXTENSION SET

## (undated) DEVICE — TUBING, SUCTION, 9/32" X 10', STRAIGHT: Brand: MEDLINE

## (undated) DEVICE — SYR 10ML LUER LOK 1/5ML GRAD --

## (undated) DEVICE — 3M™ STERI-STRIP™ REINFORCED ADHESIVE SKIN CLOSURES, R1546, 1/4 IN X 4 IN (6 MM X 100 MM), 10 STRIPS/ENVELOPE: Brand: 3M™ STERI-STRIP™

## (undated) DEVICE — GOWN,AURORA,FABRIC-REINFORCED,X-LARGE: Brand: MEDLINE